# Patient Record
Sex: FEMALE | Race: OTHER | HISPANIC OR LATINO
[De-identification: names, ages, dates, MRNs, and addresses within clinical notes are randomized per-mention and may not be internally consistent; named-entity substitution may affect disease eponyms.]

---

## 2017-06-27 ENCOUNTER — APPOINTMENT (OUTPATIENT)
Dept: DERMATOLOGY | Facility: CLINIC | Age: 52
End: 2017-06-27

## 2017-06-27 ENCOUNTER — CHART COPY (OUTPATIENT)
Age: 52
End: 2017-06-27

## 2017-06-27 VITALS — DIASTOLIC BLOOD PRESSURE: 70 MMHG | WEIGHT: 145 LBS | SYSTOLIC BLOOD PRESSURE: 110 MMHG | BODY MASS INDEX: 26.52 KG/M2

## 2017-06-27 DIAGNOSIS — Z91.89 OTHER SPECIFIED PERSONAL RISK FACTORS, NOT ELSEWHERE CLASSIFIED: ICD-10-CM

## 2017-06-27 DIAGNOSIS — L81.4 OTHER MELANIN HYPERPIGMENTATION: ICD-10-CM

## 2017-06-27 DIAGNOSIS — Z12.83 ENCOUNTER FOR SCREENING FOR MALIGNANT NEOPLASM OF SKIN: ICD-10-CM

## 2017-06-27 DIAGNOSIS — D22.9 MELANOCYTIC NEVI, UNSPECIFIED: ICD-10-CM

## 2017-06-27 RX ORDER — CYCLOBENZAPRINE HYDROCHLORIDE 10 MG/1
10 TABLET, FILM COATED ORAL
Qty: 30 | Refills: 0 | Status: ACTIVE | COMMUNITY
Start: 2017-05-03

## 2017-06-27 RX ORDER — MECLIZINE HYDROCHLORIDE 25 MG/1
25 TABLET ORAL
Qty: 30 | Refills: 0 | Status: ACTIVE | COMMUNITY
Start: 2017-06-14

## 2017-06-27 RX ORDER — MONTELUKAST 10 MG/1
10 TABLET, FILM COATED ORAL
Qty: 30 | Refills: 0 | Status: ACTIVE | COMMUNITY
Start: 2017-05-03

## 2017-06-27 RX ORDER — FLUOCINONIDE 1 MG/G
0.1 CREAM TOPICAL
Qty: 60 | Refills: 0 | Status: ACTIVE | COMMUNITY
Start: 2017-05-30

## 2017-06-27 RX ORDER — ESCITALOPRAM OXALATE 10 MG/1
10 TABLET ORAL
Qty: 30 | Refills: 0 | Status: ACTIVE | COMMUNITY
Start: 2017-04-07

## 2017-07-11 PROBLEM — D22.9 INTRADERMAL NEVUS: Status: ACTIVE | Noted: 2017-06-27

## 2017-07-11 PROBLEM — L81.4 LENTIGINES: Status: ACTIVE | Noted: 2017-06-27

## 2019-05-07 ENCOUNTER — TRANSCRIPTION ENCOUNTER (OUTPATIENT)
Age: 54
End: 2019-05-07

## 2019-05-28 ENCOUNTER — APPOINTMENT (OUTPATIENT)
Dept: NEUROLOGY | Facility: CLINIC | Age: 54
End: 2019-05-28
Payer: COMMERCIAL

## 2019-05-28 VITALS
BODY MASS INDEX: 28.52 KG/M2 | HEART RATE: 90 BPM | HEIGHT: 62 IN | WEIGHT: 155 LBS | SYSTOLIC BLOOD PRESSURE: 133 MMHG | DIASTOLIC BLOOD PRESSURE: 89 MMHG

## 2019-05-28 DIAGNOSIS — G37.9 DEMYELINATING DISEASE OF CENTRAL NERVOUS SYSTEM, UNSPECIFIED: ICD-10-CM

## 2019-05-28 DIAGNOSIS — H46.9 UNSPECIFIED OPTIC NEURITIS: ICD-10-CM

## 2019-05-28 DIAGNOSIS — F41.9 ANXIETY DISORDER, UNSPECIFIED: ICD-10-CM

## 2019-05-28 DIAGNOSIS — R20.0 ANESTHESIA OF SKIN: ICD-10-CM

## 2019-05-28 DIAGNOSIS — R42 DIZZINESS AND GIDDINESS: ICD-10-CM

## 2019-05-28 PROCEDURE — 99205 OFFICE O/P NEW HI 60 MIN: CPT

## 2019-05-28 NOTE — PHYSICAL EXAM
[FreeTextEntry1] : Awake, alert, oriented x 3.  Language fluent.  Comprehension intact.  Naming intact.  Repetition intact.  Affect normal.  Cranial nerves grossly intact, except for surgical right pupil.  Motor exam: normal bulk, normal tone, 5/5 in all four extremities.  No tremors or fasciculations.  Sensory exam: impaired sensation to LT on left side of face, V1/V3, 50% reduced sensation.  DTRs: 2+ throughout, flexor plantar response bilaterally, no clonus.  Coordination: no dysmetria.  Gait: unsteady tandem gait.  Romberg - positive\par

## 2019-05-28 NOTE — HISTORY OF PRESENT ILLNESS
[FreeTextEntry1] : 52 yo woman who was diagnosed with fibromyalgia 2 years ago, followed by rheumatology.  She takes Lyrica 150mg BID and Cymbalta 60mg daily.  She also has rheumatoid arthritis for which she takes Olumiant.  She takes Flexeril 20mg as well for pain.\par \par However, over the past 4-5 months she has been having intermittent episodes of severe numbness on the left side of her face, which is intermittent, but when it occurs she can not feel the left side of her face.  When it occurs, it lasts 1-2 hours, and lately has been occurring on a daily basis, worsened by stress.  No associated headaches, although she has a history of migraines in the past.\par \par She also has had left sided tinnitus over the past 2 years, which has become more constant over the past 2 months.  She thinks she has some hearing loss on the left as well.  She also gets intermittent vertigo.  She takes Meclizine prn.\par \par She takes Losartan 100mg daily for HTN.\par \par 5-6 years ago she had a brain MRI and LP because her ophthalmologist was concerned that she might have MS based on her optic nerve exam.  She had a right eye cataract at age 28.  She says she has a history of "optic nerve swelling" in the past, which was treated with steroids.   No evidence of MS on that study.

## 2019-05-28 NOTE — ASSESSMENT
[FreeTextEntry1] : 54 yo woman with intermittent left facial numbness.  History of optic neuritis, so demyelinating disease should be ruled out (ie brainstem demyelination).  \par \par 2-year history of intermittent vertigo, tinnitus, and possible hearing impairment (Meniere's disease?).\par \par Plan:\par 1. MRI brain c/s gadolinium\par 2. ENT consult\par 3. Patient agrees with plan.\par 4. Follow up after testing completed.\par \par Asael Gaviria MD\par [facility]\par \par Time Spent: 60 minutes taking history, performing examination, and counseling on diagnosis and management.\par

## 2019-06-07 ENCOUNTER — APPOINTMENT (OUTPATIENT)
Dept: MRI IMAGING | Facility: CLINIC | Age: 54
End: 2019-06-07
Payer: COMMERCIAL

## 2019-06-07 ENCOUNTER — OUTPATIENT (OUTPATIENT)
Dept: OUTPATIENT SERVICES | Facility: HOSPITAL | Age: 54
LOS: 1 days | End: 2019-06-07
Payer: COMMERCIAL

## 2019-06-07 DIAGNOSIS — H93.A2 PULSATILE TINNITUS, LEFT EAR: ICD-10-CM

## 2019-06-07 DIAGNOSIS — Z98.89 OTHER SPECIFIED POSTPROCEDURAL STATES: Chronic | ICD-10-CM

## 2019-06-07 DIAGNOSIS — R20.0 ANESTHESIA OF SKIN: ICD-10-CM

## 2019-06-07 DIAGNOSIS — H46.9 UNSPECIFIED OPTIC NEURITIS: ICD-10-CM

## 2019-06-07 DIAGNOSIS — G37.9 DEMYELINATING DISEASE OF CENTRAL NERVOUS SYSTEM, UNSPECIFIED: ICD-10-CM

## 2019-06-07 DIAGNOSIS — H91.92 UNSPECIFIED HEARING LOSS, LEFT EAR: ICD-10-CM

## 2019-06-07 PROCEDURE — A9585: CPT

## 2019-06-07 PROCEDURE — 70553 MRI BRAIN STEM W/O & W/DYE: CPT | Mod: 26

## 2019-06-07 PROCEDURE — 70553 MRI BRAIN STEM W/O & W/DYE: CPT

## 2019-06-17 ENCOUNTER — APPOINTMENT (OUTPATIENT)
Dept: NEUROLOGY | Facility: CLINIC | Age: 54
End: 2019-06-17
Payer: COMMERCIAL

## 2019-06-17 VITALS
HEART RATE: 69 BPM | HEIGHT: 62 IN | WEIGHT: 150 LBS | DIASTOLIC BLOOD PRESSURE: 91 MMHG | BODY MASS INDEX: 27.6 KG/M2 | SYSTOLIC BLOOD PRESSURE: 142 MMHG

## 2019-06-17 DIAGNOSIS — H83.2X3 LABYRINTHINE DYSFUNCTION, BILATERAL: ICD-10-CM

## 2019-06-17 PROCEDURE — 99214 OFFICE O/P EST MOD 30 MIN: CPT

## 2019-06-18 NOTE — ASSESSMENT
[FreeTextEntry1] : 52 yo woman with intermittent left facial numbness. History of optic neuritis, so demyelinating disease should be ruled out (ie brainstem demyelination). \par \par 2-year history of intermittent vertigo, tinnitus, and possible hearing impairment (Meniere's disease?).\par \par Plan:\par 1. Consult Dr Delarosa to review brain MRI and history, provide opinion regarding white matter changes (nonspecific)\par 2. ENT consult pending\par 3. Patient agrees with plan.\par \par Asael Gaviria MD\par [facility]\par \par Time Spent: 25 minutes taking history, performing examination, and counseling on diagnosis and management.

## 2019-06-18 NOTE — PHYSICAL EXAM
[FreeTextEntry1] : Awake, alert, oriented x 3. Language fluent. Comprehension intact. Naming intact. Repetition intact. Affect normal. Cranial nerves grossly intact, except for surgical right pupil. Motor exam: normal bulk, normal tone, 5/5 in all four extremities. No tremors or fasciculations. Sensory exam: impaired sensation to LT on left side of face, V1/V3, 50% reduced sensation. DTRs: 2+ throughout, flexor plantar response bilaterally, no clonus. Coordination: no dysmetria. Gait: unsteady tandem gait. Romberg - positive\par .

## 2019-06-18 NOTE — HISTORY OF PRESENT ILLNESS
[FreeTextEntry1] : 52 yo woman with intermittent left facial numbness. History of optic neuritis, so demyelinating disease should be ruled out (ie brainstem demyelination). \par \par 2-year history of intermittent vertigo, tinnitus, and possible hearing impairment (Meniere's disease?).\par \par She has an appointment with ENT pending.\par \par MRI brain was done and shows nonspecific white matter changes in the frontal lobes bilaterally, no brainstem lesions.\par \par MRI cervical done in 2016 did not show any spinal cord lesions at this level.  MRI brain done in 2016 reports nonspecific white matter changes at that time as well.\par

## 2019-07-30 ENCOUNTER — APPOINTMENT (OUTPATIENT)
Dept: OTOLARYNGOLOGY | Facility: CLINIC | Age: 54
End: 2019-07-30
Payer: COMMERCIAL

## 2019-07-30 DIAGNOSIS — H93.A2 PULSATILE TINNITUS, LEFT EAR: ICD-10-CM

## 2019-07-30 DIAGNOSIS — H91.92 UNSPECIFIED HEARING LOSS, LEFT EAR: ICD-10-CM

## 2019-07-30 DIAGNOSIS — H90.3 SENSORINEURAL HEARING LOSS, BILATERAL: ICD-10-CM

## 2019-07-30 PROCEDURE — 99204 OFFICE O/P NEW MOD 45 MIN: CPT | Mod: 25

## 2019-07-30 PROCEDURE — 92557 COMPREHENSIVE HEARING TEST: CPT

## 2019-07-30 PROCEDURE — 92625 TINNITUS ASSESSMENT: CPT

## 2019-07-30 PROCEDURE — 92567 TYMPANOMETRY: CPT

## 2019-07-31 ENCOUNTER — APPOINTMENT (OUTPATIENT)
Dept: NEUROLOGY | Facility: CLINIC | Age: 54
End: 2019-07-31
Payer: COMMERCIAL

## 2019-07-31 VITALS
HEART RATE: 78 BPM | DIASTOLIC BLOOD PRESSURE: 83 MMHG | BODY MASS INDEX: 27.6 KG/M2 | WEIGHT: 150 LBS | SYSTOLIC BLOOD PRESSURE: 143 MMHG | HEIGHT: 62 IN

## 2019-07-31 PROBLEM — H91.92 HEARING LOSS OF LEFT EAR, UNSPECIFIED HEARING LOSS TYPE: Noted: 2019-05-28

## 2019-07-31 PROBLEM — H90.3 BILATERAL HIGH FREQUENCY SENSORINEURAL HEARING LOSS: Status: ACTIVE | Noted: 2019-07-31

## 2019-07-31 PROBLEM — H93.A2 PULSATILE TINNITUS OF LEFT EAR: Noted: 2019-05-28

## 2019-07-31 PROCEDURE — 99215 OFFICE O/P EST HI 40 MIN: CPT

## 2019-07-31 RX ORDER — CICLOPIROX 80 MG/ML
8 SOLUTION TOPICAL
Qty: 66 | Refills: 0 | Status: ACTIVE | COMMUNITY
Start: 2018-11-26

## 2019-07-31 RX ORDER — ATORVASTATIN CALCIUM 20 MG/1
20 TABLET, FILM COATED ORAL
Qty: 30 | Refills: 0 | Status: ACTIVE | COMMUNITY
Start: 2018-12-18

## 2019-07-31 RX ORDER — PILOCARPINE HYDROCHLORIDE 5 MG/1
5 TABLET, FILM COATED ORAL
Qty: 90 | Refills: 0 | Status: ACTIVE | COMMUNITY
Start: 2019-01-29

## 2019-07-31 RX ORDER — BUDESONIDE AND FORMOTEROL FUMARATE DIHYDRATE 160; 4.5 UG/1; UG/1
160-4.5 AEROSOL RESPIRATORY (INHALATION)
Qty: 102 | Refills: 0 | Status: ACTIVE | COMMUNITY
Start: 2019-02-16

## 2019-07-31 NOTE — HISTORY OF PRESENT ILLNESS
[de-identified] : 52yo woman with complicated rheumatologic and neurologic history\par here primarily for left sided tinnitus\par tinnitus is bilateral and high pitched \par mores so on the left\par moderately bothersome\par more intense when she gets L facial numbness and neck pain\par not sure if she has hearing loss\par +abnormal MRI\par LP was inconclusive\par no otologic history\par \par

## 2019-07-31 NOTE — PHYSICAL EXAM
[de-identified] : + TTP on R [Midline] : trachea located in midline position [Normal] : no rashes

## 2019-07-31 NOTE — DATA REVIEWED
[de-identified] : melinda HF SNHL, some asymmetry at 2k; tinnitus pitch match at 2k and 6k [de-identified] : +T2 hyperintensities

## 2019-07-31 NOTE — HISTORY OF PRESENT ILLNESS
[de-identified] : 54yo woman with complicated rheumatologic and neurologic history\par here primarily for left sided tinnitus\par tinnitus is bilateral and high pitched \par mores so on the left\par moderately bothersome\par more intense when she gets L facial numbness and neck pain\par not sure if she has hearing loss\par +abnormal MRI\par LP was inconclusive\par no otologic history\par \par

## 2019-07-31 NOTE — HISTORY OF PRESENT ILLNESS
[FreeTextEntry1] : Reason for consult: possible MS\par \par HPI: PARAS FRANK is a 53 year old woman \par \par 27yo - removed R eye cataract. 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 however, even after the cataract removal, R eye vision continued to worsen. Over the years, has continued to get gradually worse - R eye vision has been "closing up". Fluctuates somewhat in severity. Recently, left eye is also affected.\par 2013 - started seeing Dr. Roper, saw ? optic neuropathy or ? uveitis (pt is not sure), then sent for brain and spine MRIs, and LP. MRIs. LP was inconclusive as sample had been discarded. Was not given a diagnosis.\par 2015 - developed pain in L arm and L neck, intermittent L arm numbness and pain.\par 10/2015 - sent to rheumatologist, dx'd with fibromyalgia and RA. was started on humira, then switched to olumiant (sameer kinase inhibitor). pain is controlled with medication and with avoidance of caffeine.\par 2016 - developed vertigo, chronic and constant. associated with intermittent imbalance. tinnitus on the left. saw ENT yesterday.\par 2018 - L facial numbness, intermittent. improved with lyrica.\par 5/2019 - had 12 days of vertigo, severe.\par \par ROS/Current Sx:\par pain\par stifness\par generalized weakness as above\par otherwise negative or as per hpi\par \par PMHX:\par RA\par fibromyalgia\par progressive vision changes\par \par MEDS:\par lyrica\par cymbalta\par losartan\par olumiant\par flexeril\par CBD oil\par \par ALL: nkda\par \par SHx: no tob, no etoh, no drugs. \par \par FHx: no MS. mother with fibromyalgia.\par \par Vitals: mild htn, i advised her to discuss with pmd.\par \par Exam:\par \par AO3.  Normally conversant.  Follows commands, names, and repeats.  Good attention.\par \par PERRL, VFF, EOMI, no nystagmus, face symmetric, TUP at midline. no apd. i don't appreciate pallor on my exam, disc margins sharp.\par \par Motor: \par                                                 R:                               L:\par Del                                           5                                5\par Bi                                              5                               5\par Tri                                            5                               5\par Wrist Extensors                      5                               5\par Finger abductors                    5                               5\par                                         5                               5 \par \par HF                                           5                               5\par KE                                           5                               5\par KF                                           5                               5\par DF                                           5                               5\par PF                                           5                               5\par \par Tone                                       R                               L\par UE                                          0                                0 \par LE                                          0                                0\par \par Sensory                                RUE                      LUE                 RLE                LLE     \par LT                                           +                            +                      +                   +\par Vib                                          +                            +                      +                   +\par JPS                                         +                            +                      +                   +\par PP                                         +                            +                      +                   +\par Temp                                     +                            +                      +                   +\par \par Reflexes:\par                                              R                             L                            \par Biceps                                  3                             3\par BR                                        3                           3\par Triceps                              \par Pat                                        3                            3 \par AJ                                        3                            3\par \par TOES                                    F                            F\par \par \par Coordination:\par                                              R                             L                       \par FTN                                       0                             0 \par SWETHA                                      0                            0\par HTS                                      0                             0 \par \par Other                                                                          \par  \par Gait: normal, minimal diff with tandem but can do it.\par \par                     Assistance: none\par \par CSF 6/2013\par nmo csf neg\par protein <10\par gluc 49\par \par MRI L spine 2015 - read as degen disc dz\par \par MRI C spine 12/2016 - read as disc herniations but no cord signal changes\par \par MRI brain 12/2016 - read as nonspecific t2h.\par \par MRI brain 6/2019 - reviewed and notable for several nonspecifc t2h.\par \par 12/2016 EMG - read as no e/o peripheral neuropathy or LS radiculopathy.\par \par \par AP: 54yo w/ several sensory complaints, over several years, at one point thought to have ? optic neuropathy vs uveitis and had brain MRI read as having nonspecific t2h. Recent brain MRI also demonstrates nonspecific t2h and no lesions suggestive of demyelination or MS. Clinically, the gradual nature of her vision loss over years is not suggestive of optic neuritis, which is characterized by more of an acute change. I cannot rule out a possible optic neuropathy of another etiology though I do not see an APD on exam and I did not note pallor on my exam, which, given the extent of her visual complaints, suggests an alternate diagnosis.\par \par - pt advised to send me copy of her MRI C spine for my review and confirmation that there are no cord lesions.\par - pt will follow up with Dr. Gaviria, rheum, and neuro-ophtho for further management. RTC PRN.\par \par \par \par \par

## 2019-07-31 NOTE — PHYSICAL EXAM
[de-identified] : + TTP on R [Midline] : trachea located in midline position [Normal] : no rashes

## 2019-07-31 NOTE — DATA REVIEWED
[de-identified] : melinda HF SNHL, some asymmetry at 2k; tinnitus pitch match at 2k and 6k [de-identified] : +T2 hyperintensities

## 2019-12-10 ENCOUNTER — APPOINTMENT (OUTPATIENT)
Dept: OTOLARYNGOLOGY | Facility: CLINIC | Age: 54
End: 2019-12-10
Payer: COMMERCIAL

## 2019-12-10 VITALS
SYSTOLIC BLOOD PRESSURE: 154 MMHG | WEIGHT: 145 LBS | HEART RATE: 89 BPM | DIASTOLIC BLOOD PRESSURE: 88 MMHG | HEIGHT: 62 IN | BODY MASS INDEX: 26.68 KG/M2

## 2019-12-10 DIAGNOSIS — H93.12 TINNITUS, LEFT EAR: ICD-10-CM

## 2019-12-10 DIAGNOSIS — M26.621 ARTHRALGIA OF RIGHT TEMPOROMANDIBULAR JOINT: ICD-10-CM

## 2019-12-10 PROCEDURE — 99213 OFFICE O/P EST LOW 20 MIN: CPT

## 2019-12-10 RX ORDER — SULFASALAZINE 500 MG/1
500 TABLET ORAL
Qty: 60 | Refills: 0 | Status: DISCONTINUED | COMMUNITY
Start: 2017-05-16 | End: 2019-12-10

## 2019-12-10 RX ORDER — KETOCONAZOLE 20 MG/G
2 CREAM TOPICAL
Qty: 30 | Refills: 0 | Status: DISCONTINUED | COMMUNITY
Start: 2018-11-26 | End: 2019-12-10

## 2019-12-10 RX ORDER — OXAPROZIN 600 MG/1
600 TABLET ORAL
Qty: 60 | Refills: 0 | Status: DISCONTINUED | COMMUNITY
Start: 2017-05-03 | End: 2019-12-10

## 2019-12-10 RX ORDER — BARICITINIB 1 MG/1
1 TABLET, FILM COATED ORAL
Refills: 0 | Status: ACTIVE | COMMUNITY

## 2019-12-10 RX ORDER — ALBUTEROL SULFATE 90 UG/1
108 (90 BASE) AEROSOL, METERED RESPIRATORY (INHALATION)
Qty: 8 | Refills: 0 | Status: DISCONTINUED | COMMUNITY
Start: 2017-04-07 | End: 2019-12-10

## 2019-12-10 RX ORDER — CELECOXIB 200 MG/1
200 CAPSULE ORAL
Qty: 30 | Refills: 0 | Status: DISCONTINUED | COMMUNITY
Start: 2017-05-03 | End: 2019-12-10

## 2019-12-10 RX ORDER — LEFLUNOMIDE 10 MG/1
10 TABLET, FILM COATED ORAL
Qty: 30 | Refills: 0 | Status: DISCONTINUED | COMMUNITY
Start: 2017-06-14 | End: 2019-12-10

## 2019-12-10 RX ORDER — PREDNISONE 10 MG/1
10 TABLET ORAL
Qty: 30 | Refills: 0 | Status: DISCONTINUED | COMMUNITY
Start: 2017-05-16 | End: 2019-12-10

## 2019-12-10 RX ORDER — OXYBUTYNIN CHLORIDE 5 MG/1
5 TABLET ORAL
Qty: 30 | Refills: 0 | Status: DISCONTINUED | COMMUNITY
Start: 2019-01-23 | End: 2019-12-10

## 2019-12-10 RX ORDER — GABAPENTIN 400 MG/1
400 CAPSULE ORAL
Qty: 90 | Refills: 0 | Status: DISCONTINUED | COMMUNITY
Start: 2017-04-07 | End: 2019-12-10

## 2019-12-10 RX ORDER — METHOCARBAMOL 750 MG/1
750 TABLET, FILM COATED ORAL
Qty: 30 | Refills: 0 | Status: DISCONTINUED | COMMUNITY
Start: 2018-09-25 | End: 2019-12-10

## 2019-12-10 RX ORDER — ALPRAZOLAM 0.5 MG/1
0.5 TABLET ORAL
Qty: 1 | Refills: 0 | Status: DISCONTINUED | COMMUNITY
Start: 2019-05-28 | End: 2019-12-10

## 2019-12-10 RX ORDER — LOSARTAN POTASSIUM 100 MG/1
100 TABLET, FILM COATED ORAL
Qty: 30 | Refills: 0 | Status: DISCONTINUED | COMMUNITY
Start: 2019-04-11 | End: 2019-12-10

## 2019-12-10 RX ORDER — ERGOCALCIFEROL 1.25 MG/1
1.25 MG CAPSULE, LIQUID FILLED ORAL
Qty: 4 | Refills: 0 | Status: DISCONTINUED | COMMUNITY
Start: 2018-12-18 | End: 2019-12-10

## 2019-12-10 RX ORDER — LEVOFLOXACIN 500 MG/1
500 TABLET, FILM COATED ORAL
Qty: 5 | Refills: 0 | Status: DISCONTINUED | COMMUNITY
Start: 2017-05-22 | End: 2019-12-10

## 2019-12-13 PROBLEM — H93.12 LEFT-SIDED TINNITUS: Status: ACTIVE | Noted: 2019-07-31

## 2019-12-13 PROBLEM — M26.621 ARTHRALGIA OF RIGHT TEMPOROMANDIBULAR JOINT: Status: ACTIVE | Noted: 2019-12-13

## 2019-12-13 NOTE — HISTORY OF PRESENT ILLNESS
[de-identified] : 54 year old woman follow up left tinnitus.  States bilateral tinnitus, left is worse than the right.  STates the tinnitus is worse in both ears since last visit July 30, 2019.  States sounds like an elongated beep sound, lasting a minute, occurring in one ear at a time, but occurs in both, more frequently in the right.  Denies otalgia.  States feels hearing is worse than at last visit. has tried tinnitus ene but did not load it properly for notched therapy. also has not had myofascial release therapy.

## 2019-12-13 NOTE — PHYSICAL EXAM
[de-identified] : + TTP on R [Midline] : trachea located in midline position [Normal] : no rashes

## 2021-01-05 ENCOUNTER — APPOINTMENT (OUTPATIENT)
Dept: GASTROENTEROLOGY | Facility: CLINIC | Age: 56
End: 2021-01-05

## 2021-09-14 ENCOUNTER — NON-APPOINTMENT (OUTPATIENT)
Age: 56
End: 2021-09-14

## 2021-09-20 ENCOUNTER — APPOINTMENT (OUTPATIENT)
Dept: SURGERY | Facility: CLINIC | Age: 56
End: 2021-09-20
Payer: COMMERCIAL

## 2021-09-20 VITALS
BODY MASS INDEX: 25.76 KG/M2 | DIASTOLIC BLOOD PRESSURE: 100 MMHG | TEMPERATURE: 98 F | SYSTOLIC BLOOD PRESSURE: 165 MMHG | HEIGHT: 62 IN | HEART RATE: 71 BPM | WEIGHT: 140 LBS

## 2021-09-20 DIAGNOSIS — K80.20 CALCULUS OF GALLBLADDER W/OUT CHOLECYSTITIS W/OUT OBSTRUCTION: ICD-10-CM

## 2021-09-20 PROCEDURE — 99203 OFFICE O/P NEW LOW 30 MIN: CPT

## 2021-09-20 NOTE — PHYSICAL EXAM
[Respiratory Effort] : normal respiratory effort [Alert] : alert [Oriented to Person] : oriented to person [Oriented to Place] : oriented to place [Oriented to Time] : oriented to time [Calm] : calm [JVD] : no jugular venous distention  [Abdomen Tenderness] : ~T ~M No abdominal tenderness [de-identified] : GI GUAJARDO NAD [de-identified] : EOMI [de-identified] : soft NT ND

## 2021-09-20 NOTE — HISTORY OF PRESENT ILLNESS
[de-identified] : 57 yo female presents for evaluation of recurrent episodes of abdominal bloating and nausea s/p fatty meals. She states she had a recent endoscopy which was normal and was then sent for a RUQ sonogram which revealed gallstones. She denies any Severe pains or vomiting. She denies any abdominal surgeries.

## 2021-10-07 ENCOUNTER — OUTPATIENT (OUTPATIENT)
Dept: OUTPATIENT SERVICES | Facility: HOSPITAL | Age: 56
LOS: 1 days | Discharge: HOME | End: 2021-10-07
Payer: COMMERCIAL

## 2021-10-07 VITALS
HEART RATE: 66 BPM | DIASTOLIC BLOOD PRESSURE: 97 MMHG | TEMPERATURE: 98 F | RESPIRATION RATE: 16 BRPM | WEIGHT: 149.03 LBS | SYSTOLIC BLOOD PRESSURE: 153 MMHG | OXYGEN SATURATION: 98 % | HEIGHT: 61 IN

## 2021-10-07 DIAGNOSIS — K80.20 CALCULUS OF GALLBLADDER WITHOUT CHOLECYSTITIS WITHOUT OBSTRUCTION: ICD-10-CM

## 2021-10-07 DIAGNOSIS — Z98.89 OTHER SPECIFIED POSTPROCEDURAL STATES: Chronic | ICD-10-CM

## 2021-10-07 DIAGNOSIS — Z01.818 ENCOUNTER FOR OTHER PREPROCEDURAL EXAMINATION: ICD-10-CM

## 2021-10-07 LAB
ALBUMIN SERPL ELPH-MCNC: 4.3 G/DL — SIGNIFICANT CHANGE UP (ref 3.3–5)
ALP SERPL-CCNC: 78 U/L — SIGNIFICANT CHANGE UP (ref 40–120)
ALT FLD-CCNC: 31 U/L — SIGNIFICANT CHANGE UP (ref 12–78)
ANION GAP SERPL CALC-SCNC: 5 MMOL/L — SIGNIFICANT CHANGE UP (ref 5–17)
AST SERPL-CCNC: 21 U/L — SIGNIFICANT CHANGE UP (ref 15–37)
BILIRUB SERPL-MCNC: 0.5 MG/DL — SIGNIFICANT CHANGE UP (ref 0.2–1.2)
BLD GP AB SCN SERPL QL: SIGNIFICANT CHANGE UP
BUN SERPL-MCNC: 16 MG/DL — SIGNIFICANT CHANGE UP (ref 7–23)
CALCIUM SERPL-MCNC: 9.9 MG/DL — SIGNIFICANT CHANGE UP (ref 8.5–10.1)
CHLORIDE SERPL-SCNC: 105 MMOL/L — SIGNIFICANT CHANGE UP (ref 96–108)
CO2 SERPL-SCNC: 27 MMOL/L — SIGNIFICANT CHANGE UP (ref 22–31)
CREAT SERPL-MCNC: 0.84 MG/DL — SIGNIFICANT CHANGE UP (ref 0.5–1.3)
GLUCOSE SERPL-MCNC: 102 MG/DL — HIGH (ref 70–99)
HCG UR QL: NEGATIVE — SIGNIFICANT CHANGE UP
HCT VFR BLD CALC: 41.9 % — SIGNIFICANT CHANGE UP (ref 34.5–45)
HGB BLD-MCNC: 13.9 G/DL — SIGNIFICANT CHANGE UP (ref 11.5–15.5)
MCHC RBC-ENTMCNC: 29 PG — SIGNIFICANT CHANGE UP (ref 27–34)
MCHC RBC-ENTMCNC: 33.2 GM/DL — SIGNIFICANT CHANGE UP (ref 32–36)
MCV RBC AUTO: 87.5 FL — SIGNIFICANT CHANGE UP (ref 80–100)
NRBC # BLD: 0 /100 WBCS — SIGNIFICANT CHANGE UP (ref 0–0)
PLATELET # BLD AUTO: 209 K/UL — SIGNIFICANT CHANGE UP (ref 150–400)
POTASSIUM SERPL-MCNC: 4.1 MMOL/L — SIGNIFICANT CHANGE UP (ref 3.5–5.3)
POTASSIUM SERPL-SCNC: 4.1 MMOL/L — SIGNIFICANT CHANGE UP (ref 3.5–5.3)
PROT SERPL-MCNC: 7.4 GM/DL — SIGNIFICANT CHANGE UP (ref 6–8.3)
RBC # BLD: 4.79 M/UL — SIGNIFICANT CHANGE UP (ref 3.8–5.2)
RBC # FLD: 12.9 % — SIGNIFICANT CHANGE UP (ref 10.3–14.5)
SODIUM SERPL-SCNC: 137 MMOL/L — SIGNIFICANT CHANGE UP (ref 135–145)
WBC # BLD: 3.87 K/UL — SIGNIFICANT CHANGE UP (ref 3.8–10.5)
WBC # FLD AUTO: 3.87 K/UL — SIGNIFICANT CHANGE UP (ref 3.8–10.5)

## 2021-10-07 PROCEDURE — 93010 ELECTROCARDIOGRAM REPORT: CPT

## 2021-10-07 NOTE — H&P PST ADULT - TOBACCO USE
Patient alert and oriented x4. On Bipap this AM, using NC on 5L this afternoon, sats in the mid 90s, patient denies SOB. Pain 8/10, prn PO pain medication given, patient reported relief of pain. Denies nausea. Tolerating diet, good appetite. Continuing IV antibiotics and IV solu-medrol. Large incontinent of bladder x1 this shift. Discharge pending.    Never smoker

## 2021-10-07 NOTE — H&P PST ADULT - NSICDXFAMILYHX_GEN_ALL_CORE_FT
FAMILY HISTORY:  Father  Still living? Unknown  Family history of diabetes mellitus (DM), Age at diagnosis: Age Unknown    Mother  Still living? Unknown  Family history of hypertension, Age at diagnosis: Age Unknown    Grandparent  Still living? Unknown  Family history of diabetes mellitus (DM), Age at diagnosis: Age Unknown

## 2021-10-07 NOTE — H&P PST ADULT - ASSESSMENT
56 year old female with a past medical history of RA and fibromyalgia c/o right upper abdominal pain. She went to her PCP, imaging was performed and reveled gallstones.  She is scheduled for a laparoscopic cholecystectomy possible open on 10/14/2021.    CAPRINI SCORE [CLOT]    AGE RELATED RISK FACTORS                                                       MOBILITY RELATED FACTORS  [x ] Age 41-60 years                                            (1 Point)                  [ ] Bed rest                                                        (1 Point)  [ ] Age: 61-74 years                                           (2 Points)                 [ ] Plaster cast                                                   (2 Points)  [ ] Age= 75 years                                              (3 Points)                 [ ] Bed bound for more than 72 hours                 (2 Points)    DISEASE RELATED RISK FACTORS                                               GENDER SPECIFIC FACTORS  [ ] Edema in the lower extremities                       (1 Point)                  [ ] Pregnancy                                                     (1 Point)  [ ] Varicose veins                                               (1 Point)                  [ ] Post-partum < 6 weeks                                   (1 Point)             x] BMI > 25 Kg/m2                                            (1 Point)                  [ ] Hormonal therapy  or oral contraception          (1 Point)                 [ ] Sepsis (in the previous month)                        (1 Point)                  [ ] History of pregnancy complications                 (1 point)  [ ] Pneumonia or serious lung disease                                               [ ] Unexplained or recurrent                     (1 Point)           (in the previous month)                               (1 Point)  [ ] Abnormal pulmonary function test                     (1 Point)                 SURGERY RELATED RISK FACTORS  [ ] Acute myocardial infarction                              (1 Point)                 [ ]  Section                                             (1 Point)  [ ] Congestive heart failure (in the previous month)  (1 Point)               [ ] Minor surgery                                                  (1 Point)   [ ] Inflammatory bowel disease                             (1 Point)                 [ ] Arthroscopic surgery                                        (2 Points)  [ ] Central venous access                                      (2 Points)                [x ] General surgery lasting more than 45 minutes   (2 Points)       [ ] Stroke (in the previous month)                          (5 Points)               [ ] Elective arthroplasty                                         (5 Points)                                                                                                                                               HEMATOLOGY RELATED FACTORS                                                 TRAUMA RELATED RISK FACTORS  [ ] Prior episodes of VTE                                     (3 Points)                [ ] Fracture of the hip, pelvis, or leg                       (5 Points)  [ ] Positive family history for VTE                         (3 Points)                 [ ] Acute spinal cord injury (in the previous month)  (5 Points)  [ ] Prothrombin 54330 A                                     (3 Points)                 [ ] Paralysis  (less than 1 month)                             (5 Points)  [ ] Factor V Leiden                                             (3 Points)                  [ ] Multiple Trauma within 1 month                        (5 Points)  [ ] Lupus anticoagulants                                     (3 Points)                                                           [ ] Anticardiolipin antibodies                               (3 Points)                                                       [ ] High homocysteine in the blood                      (3 Points)                                             [ ] Other congenital or acquired thrombophilia      (3 Points)                                                [ ] Heparin induced thrombocytopenia                  (3 Points)                                          Total Score [       4  ]    Caprini Score 0 - 2:  Low Risk, No VTE Prophylaxis required for most patients, encourage ambulation  Caprini Score 3 - 6:  At Risk, pharmacologic VTE prophylaxis is indicated for most patients (in the absence of a contraindication)  Caprini Score Greater than or = 7:  High Risk, pharmacologic VTE prophylaxis is indicated for most patients (in the absence of a contraindication)

## 2021-10-07 NOTE — H&P PST ADULT - HISTORY OF PRESENT ILLNESS
56 year old female with a past medical history of RA and fibromyalgia c/o right upper abdominal pain. She went to her PCP, imaging was performed and reveled gallstones.  She is scheduled for a laparoscopic cholecystectomy possible open on 10/14/2021.    She denies fever, cough, SOB, recent travels, and sick contacts.

## 2021-10-07 NOTE — H&P PST ADULT - HEALTH CARE MAINTENANCE
Pfizer vaccine completed, pt aware covid swab is required 72 hours prior to surgery.  Colonoscopy and endoscopy 2021- ok

## 2021-10-13 ENCOUNTER — TRANSCRIPTION ENCOUNTER (OUTPATIENT)
Age: 56
End: 2021-10-13

## 2021-10-14 ENCOUNTER — RESULT REVIEW (OUTPATIENT)
Age: 56
End: 2021-10-14

## 2021-10-14 ENCOUNTER — OUTPATIENT (OUTPATIENT)
Dept: OUTPATIENT SERVICES | Facility: HOSPITAL | Age: 56
LOS: 1 days | Discharge: HOME | End: 2021-10-14
Payer: COMMERCIAL

## 2021-10-14 ENCOUNTER — APPOINTMENT (OUTPATIENT)
Dept: SURGERY | Facility: HOSPITAL | Age: 56
End: 2021-10-14

## 2021-10-14 VITALS
TEMPERATURE: 98 F | OXYGEN SATURATION: 99 % | WEIGHT: 145.95 LBS | HEART RATE: 72 BPM | SYSTOLIC BLOOD PRESSURE: 142 MMHG | HEIGHT: 62 IN | RESPIRATION RATE: 17 BRPM | DIASTOLIC BLOOD PRESSURE: 84 MMHG

## 2021-10-14 VITALS
TEMPERATURE: 98 F | HEART RATE: 74 BPM | OXYGEN SATURATION: 99 % | SYSTOLIC BLOOD PRESSURE: 122 MMHG | DIASTOLIC BLOOD PRESSURE: 68 MMHG | RESPIRATION RATE: 16 BRPM

## 2021-10-14 DIAGNOSIS — Z98.89 OTHER SPECIFIED POSTPROCEDURAL STATES: Chronic | ICD-10-CM

## 2021-10-14 LAB — HCG UR QL: NEGATIVE — SIGNIFICANT CHANGE UP

## 2021-10-14 PROCEDURE — 88304 TISSUE EXAM BY PATHOLOGIST: CPT | Mod: 26

## 2021-10-14 PROCEDURE — 47562 LAPAROSCOPIC CHOLECYSTECTOMY: CPT

## 2021-10-14 RX ORDER — SODIUM CHLORIDE 9 MG/ML
3 INJECTION INTRAMUSCULAR; INTRAVENOUS; SUBCUTANEOUS EVERY 8 HOURS
Refills: 0 | Status: DISCONTINUED | OUTPATIENT
Start: 2021-10-14 | End: 2021-10-14

## 2021-10-14 RX ORDER — HYDROMORPHONE HYDROCHLORIDE 2 MG/ML
0.5 INJECTION INTRAMUSCULAR; INTRAVENOUS; SUBCUTANEOUS
Refills: 0 | Status: DISCONTINUED | OUTPATIENT
Start: 2021-10-14 | End: 2021-10-14

## 2021-10-14 RX ORDER — SODIUM CHLORIDE 9 MG/ML
1000 INJECTION, SOLUTION INTRAVENOUS
Refills: 0 | Status: DISCONTINUED | OUTPATIENT
Start: 2021-10-14 | End: 2021-10-14

## 2021-10-14 RX ADMIN — SODIUM CHLORIDE 75 MILLILITER(S): 9 INJECTION, SOLUTION INTRAVENOUS at 09:29

## 2021-10-14 RX ADMIN — HYDROMORPHONE HYDROCHLORIDE 0.5 MILLIGRAM(S): 2 INJECTION INTRAMUSCULAR; INTRAVENOUS; SUBCUTANEOUS at 09:25

## 2021-10-14 NOTE — ASU PATIENT PROFILE, ADULT - NSICDXPASTMEDICALHX_GEN_ALL_CORE_FT
PAST MEDICAL HISTORY:  Asthma     H/O fibromyalgia     Rheumatoid arthritis     Seasonal allergies

## 2021-10-15 LAB — SURGICAL PATHOLOGY STUDY: SIGNIFICANT CHANGE UP

## 2021-10-18 PROBLEM — Z87.39 PERSONAL HISTORY OF OTHER DISEASES OF THE MUSCULOSKELETAL SYSTEM AND CONNECTIVE TISSUE: Chronic | Status: ACTIVE | Noted: 2021-10-07

## 2021-10-18 PROBLEM — J45.909 UNSPECIFIED ASTHMA, UNCOMPLICATED: Chronic | Status: ACTIVE | Noted: 2021-10-14

## 2021-10-18 PROBLEM — M06.9 RHEUMATOID ARTHRITIS, UNSPECIFIED: Chronic | Status: ACTIVE | Noted: 2021-10-07

## 2021-10-20 DIAGNOSIS — Z83.3 FAMILY HISTORY OF DIABETES MELLITUS: ICD-10-CM

## 2021-10-20 DIAGNOSIS — R10.11 RIGHT UPPER QUADRANT PAIN: ICD-10-CM

## 2021-10-20 DIAGNOSIS — Z82.49 FAMILY HISTORY OF ISCHEMIC HEART DISEASE AND OTHER DISEASES OF THE CIRCULATORY SYSTEM: ICD-10-CM

## 2021-10-20 DIAGNOSIS — M06.9 RHEUMATOID ARTHRITIS, UNSPECIFIED: ICD-10-CM

## 2021-10-20 DIAGNOSIS — K80.10 CALCULUS OF GALLBLADDER WITH CHRONIC CHOLECYSTITIS WITHOUT OBSTRUCTION: ICD-10-CM

## 2021-10-20 DIAGNOSIS — J45.909 UNSPECIFIED ASTHMA, UNCOMPLICATED: ICD-10-CM

## 2021-10-20 DIAGNOSIS — K80.20 CALCULUS OF GALLBLADDER WITHOUT CHOLECYSTITIS WITHOUT OBSTRUCTION: ICD-10-CM

## 2021-10-20 DIAGNOSIS — M79.7 FIBROMYALGIA: ICD-10-CM

## 2021-10-25 ENCOUNTER — APPOINTMENT (OUTPATIENT)
Dept: SURGERY | Facility: CLINIC | Age: 56
End: 2021-10-25
Payer: COMMERCIAL

## 2021-10-25 VITALS
HEIGHT: 62 IN | DIASTOLIC BLOOD PRESSURE: 92 MMHG | BODY MASS INDEX: 26.13 KG/M2 | SYSTOLIC BLOOD PRESSURE: 154 MMHG | TEMPERATURE: 98.1 F | WEIGHT: 142 LBS | HEART RATE: 79 BPM

## 2021-10-25 DIAGNOSIS — Z09 ENCOUNTER FOR FOLLOW-UP EXAMINATION AFTER COMPLETED TREATMENT FOR CONDITIONS OTHER THAN MALIGNANT NEOPLASM: ICD-10-CM

## 2021-10-25 PROCEDURE — 99024 POSTOP FOLLOW-UP VISIT: CPT

## 2021-10-25 NOTE — ASSESSMENT
[FreeTextEntry1] : Patient is doing well, tolerating a diet and moving her bowels normally.\par \par \par \par \par incisions are c/d/i and healing well.\par \par \par f/u prn

## 2022-12-27 ENCOUNTER — EMERGENCY (EMERGENCY)
Facility: HOSPITAL | Age: 57
LOS: 1 days | Discharge: LEFT BEFORE DISCHARGE | End: 2022-12-27
Attending: EMERGENCY MEDICINE
Payer: COMMERCIAL

## 2022-12-27 ENCOUNTER — EMERGENCY (EMERGENCY)
Facility: HOSPITAL | Age: 57
LOS: 1 days | Discharge: HOME | End: 2022-12-27
Attending: EMERGENCY MEDICINE
Payer: COMMERCIAL

## 2022-12-27 VITALS
RESPIRATION RATE: 18 BRPM | TEMPERATURE: 98 F | SYSTOLIC BLOOD PRESSURE: 178 MMHG | WEIGHT: 132.06 LBS | HEIGHT: 62 IN | DIASTOLIC BLOOD PRESSURE: 94 MMHG | OXYGEN SATURATION: 98 % | HEART RATE: 76 BPM

## 2022-12-27 VITALS
WEIGHT: 136.69 LBS | TEMPERATURE: 97 F | HEART RATE: 75 BPM | OXYGEN SATURATION: 98 % | DIASTOLIC BLOOD PRESSURE: 111 MMHG | SYSTOLIC BLOOD PRESSURE: 172 MMHG | RESPIRATION RATE: 18 BRPM

## 2022-12-27 DIAGNOSIS — Z98.89 OTHER SPECIFIED POSTPROCEDURAL STATES: Chronic | ICD-10-CM

## 2022-12-27 LAB
ALBUMIN SERPL ELPH-MCNC: 5 G/DL — SIGNIFICANT CHANGE UP (ref 3.3–5)
ALP SERPL-CCNC: 67 U/L — SIGNIFICANT CHANGE UP (ref 40–120)
ALT FLD-CCNC: 26 U/L — SIGNIFICANT CHANGE UP (ref 10–45)
ANION GAP SERPL CALC-SCNC: 11 MMOL/L — SIGNIFICANT CHANGE UP (ref 5–17)
APTT BLD: 26 SEC — LOW (ref 27.5–35.5)
AST SERPL-CCNC: 25 U/L — SIGNIFICANT CHANGE UP (ref 10–40)
BASOPHILS # BLD AUTO: 0.05 K/UL — SIGNIFICANT CHANGE UP (ref 0–0.2)
BASOPHILS NFR BLD AUTO: 0.9 % — SIGNIFICANT CHANGE UP (ref 0–2)
BILIRUB SERPL-MCNC: 0.3 MG/DL — SIGNIFICANT CHANGE UP (ref 0.2–1.2)
BUN SERPL-MCNC: 16 MG/DL — SIGNIFICANT CHANGE UP (ref 7–23)
CALCIUM SERPL-MCNC: 9.6 MG/DL — SIGNIFICANT CHANGE UP (ref 8.4–10.5)
CHLORIDE SERPL-SCNC: 102 MMOL/L — SIGNIFICANT CHANGE UP (ref 96–108)
CO2 SERPL-SCNC: 27 MMOL/L — SIGNIFICANT CHANGE UP (ref 22–31)
CREAT SERPL-MCNC: 0.79 MG/DL — SIGNIFICANT CHANGE UP (ref 0.5–1.3)
EGFR: 88 ML/MIN/1.73M2 — SIGNIFICANT CHANGE UP
EOSINOPHIL # BLD AUTO: 0.2 K/UL — SIGNIFICANT CHANGE UP (ref 0–0.5)
EOSINOPHIL NFR BLD AUTO: 3.5 % — SIGNIFICANT CHANGE UP (ref 0–6)
GLUCOSE SERPL-MCNC: 101 MG/DL — HIGH (ref 70–99)
HCT VFR BLD CALC: 41.7 % — SIGNIFICANT CHANGE UP (ref 34.5–45)
HGB BLD-MCNC: 13.4 G/DL — SIGNIFICANT CHANGE UP (ref 11.5–15.5)
IMM GRANULOCYTES NFR BLD AUTO: 0.4 % — SIGNIFICANT CHANGE UP (ref 0–0.9)
INR BLD: 1.04 RATIO — SIGNIFICANT CHANGE UP (ref 0.88–1.16)
LIDOCAIN IGE QN: 39 U/L — SIGNIFICANT CHANGE UP (ref 7–60)
LYMPHOCYTES # BLD AUTO: 1.6 K/UL — SIGNIFICANT CHANGE UP (ref 1–3.3)
LYMPHOCYTES # BLD AUTO: 28.3 % — SIGNIFICANT CHANGE UP (ref 13–44)
MCHC RBC-ENTMCNC: 29.4 PG — SIGNIFICANT CHANGE UP (ref 27–34)
MCHC RBC-ENTMCNC: 32.1 GM/DL — SIGNIFICANT CHANGE UP (ref 32–36)
MCV RBC AUTO: 91.4 FL — SIGNIFICANT CHANGE UP (ref 80–100)
MONOCYTES # BLD AUTO: 0.61 K/UL — SIGNIFICANT CHANGE UP (ref 0–0.9)
MONOCYTES NFR BLD AUTO: 10.8 % — SIGNIFICANT CHANGE UP (ref 2–14)
NEUTROPHILS # BLD AUTO: 3.18 K/UL — SIGNIFICANT CHANGE UP (ref 1.8–7.4)
NEUTROPHILS NFR BLD AUTO: 56.1 % — SIGNIFICANT CHANGE UP (ref 43–77)
NRBC # BLD: 0 /100 WBCS — SIGNIFICANT CHANGE UP (ref 0–0)
PLATELET # BLD AUTO: 286 K/UL — SIGNIFICANT CHANGE UP (ref 150–400)
POTASSIUM SERPL-MCNC: 3.9 MMOL/L — SIGNIFICANT CHANGE UP (ref 3.5–5.3)
POTASSIUM SERPL-SCNC: 3.9 MMOL/L — SIGNIFICANT CHANGE UP (ref 3.5–5.3)
PROT SERPL-MCNC: 7.4 G/DL — SIGNIFICANT CHANGE UP (ref 6–8.3)
PROTHROM AB SERPL-ACNC: 12.1 SEC — SIGNIFICANT CHANGE UP (ref 10.5–13.4)
RBC # BLD: 4.56 M/UL — SIGNIFICANT CHANGE UP (ref 3.8–5.2)
RBC # FLD: 13.6 % — SIGNIFICANT CHANGE UP (ref 10.3–14.5)
SODIUM SERPL-SCNC: 140 MMOL/L — SIGNIFICANT CHANGE UP (ref 135–145)
WBC # BLD: 5.66 K/UL — SIGNIFICANT CHANGE UP (ref 3.8–10.5)
WBC # FLD AUTO: 5.66 K/UL — SIGNIFICANT CHANGE UP (ref 3.8–10.5)

## 2022-12-27 PROCEDURE — 99284 EMERGENCY DEPT VISIT MOD MDM: CPT

## 2022-12-27 PROCEDURE — 99282 EMERGENCY DEPT VISIT SF MDM: CPT

## 2022-12-27 RX ORDER — ONDANSETRON 8 MG/1
4 TABLET, FILM COATED ORAL ONCE
Refills: 0 | Status: DISCONTINUED | OUTPATIENT
Start: 2022-12-27 | End: 2022-12-31

## 2022-12-27 RX ORDER — SODIUM CHLORIDE 9 MG/ML
1000 INJECTION INTRAMUSCULAR; INTRAVENOUS; SUBCUTANEOUS ONCE
Refills: 0 | Status: DISCONTINUED | OUTPATIENT
Start: 2022-12-27 | End: 2022-12-31

## 2022-12-27 RX ORDER — KETOROLAC TROMETHAMINE 30 MG/ML
30 SYRINGE (ML) INJECTION ONCE
Refills: 0 | Status: COMPLETED | OUTPATIENT
Start: 2022-12-27 | End: 2022-12-27

## 2022-12-27 RX ADMIN — Medication 30 MILLILITER(S): at 22:07

## 2022-12-27 NOTE — ED PROVIDER NOTE - PATIENT PORTAL LINK FT
You can access the FollowMyHealth Patient Portal offered by [facility] by registering at the following website: [email or web address]. By joining [facility]’s FollowMyHealth portal, you will also be able to view your health information using other applications (apps) compatible with our system.

## 2022-12-27 NOTE — ED PROVIDER NOTE - OBJECTIVE STATEMENT
57 year old female with PMHX of fibromyalgia presents to the ED with complaints of abdominal pain for the past three days. Patient states that she initially developed the pain on 12/24 after she ate a lot, and believed at the time that her symptoms could be attributed to bad food exposure. Patient reports, however, that he pain has been persistent over the last three days and worsened today. Patient thus visited an Urgent Care earlier today to seek evaluation, and was subsequently referred to the ED for further management. Patient notes some associated nausea, however denies any vomiting, diarrhea, fevers, urinary symptoms, chest pain, or shortness of breath. NKDA.

## 2022-12-27 NOTE — ED PROVIDER NOTE - NSFOLLOWUPINSTRUCTIONS_ED_ALL_ED_FT
you were seen in the ER today for abdominal pain     it is important to follow up with gastroenterology regarding today's visit    bring a copy of your results to your follow up appointment     medication from pharmacy and take as instructed    you can take Maalox as needed for your abdominal pain     if your symptoms worsen, persist, or if any new symptoms develop, or if you experience any signs of distress, return to the ED right away.

## 2022-12-27 NOTE — ED PROVIDER NOTE - CLINICAL SUMMARY MEDICAL DECISION MAKING FREE TEXT BOX
Patient presents with several days of epigastric intermittent pain worse with eating.  On exam, patient is well-appearing, unremarkable vital signs, soft minimally tender abdomen in the epigastric region without guarding or rebound.  Patient had work-up including labs, CT of abdomen and pelvis.  Labs are unremarkable, CT shows signs of gastritis versus PUD, without any other acute process.  Patient is status postcholecystectomy so suspicion for gallbladder pathology is very low.  Patient got relief with Maalox.  Symptoms are most consistent with PUD versus gastritis.  There are no signs of any other emergent process at this time, and patient is safe for discharge home with supportive care with Pepcid, Maalox as needed, GI follow-up for further evaluation of  suspected PUD as an outpatient.  Patient was given return precautions.

## 2022-12-27 NOTE — ED PROVIDER NOTE - OBJECTIVE STATEMENT
56 y/o female, hx of fibromyalgia, cholecystectomy, presents to the ED with complaint of abdominal pain x three days. states started paris ulisses after she ate a heavy meal. states pain started in her lower abdomen and worked its way up to her epigastric area. states pain is intermittent. made worse with food. denies f/n/v/d, CP, SOB, LOC, urinary symptoms, dizziness, HA.

## 2022-12-27 NOTE — ED PROVIDER NOTE - BIRTH SEX
Spoke to patient and relayed from her provider, \"that her X-rays showed some arthritis changes but no fracture. Depending on what is bothering her the most, she would recommend that she see Orthopedics or Podiatry for consult. \" Patient did cancel her upcoming appointment on the 14th. Female

## 2022-12-27 NOTE — ED PROVIDER NOTE - NSICDXPASTMEDICALHX_GEN_ALL_CORE_FT
PAST MEDICAL HISTORY:  Asthma     H/O fibromyalgia     Rheumatoid arthritis     Seasonal allergies

## 2022-12-27 NOTE — ED ADULT TRIAGE NOTE - GLASGOW COMA SCALE: EYE OPENING, MLM
Shai Kent is a 69 y.o. male here to establish care and discuss chronic medical issues.  History of type 2 diabetes, hypertension, dyslipidemia.  Diabetes has been treated with metformin and glipizide. No follow up for about 1 year - last A1c 7.3 10/21  He has followed with cardiology once yearly. Nuclear stress test in Sept 2018 was normal. Echo in 2020 showed normal LVEF with mild AS    Current medicines (including changes today)  Current Outpatient Medications   Medication Sig Dispense Refill    amLODIPine (NORVASC) 5 MG Tab Take 5 mg by mouth every day.      metFORMIN (GLUCOPHAGE) 500 MG Tab Take 2 Tablets by mouth 2 times a day.      aspirin (ASA) 325 MG Tab Take 325 mg by mouth every 6 hours as needed.      BROMSITE 0.075 % Solution INSTILL 1 DROP IN POST OPERATIVE EYE ONCE DAILY      ezetimibe (ZETIA) 10 MG Tab Take 1 Tablet by mouth every day.      clobetasol (TEMOVATE) 0.05 % Ointment       glipiZIDE (GLUCOTROL) 5 MG Tab TAKE 1 TABLET BY MOUTH DAILY, WITH MEAL TIME ADMINISTRATION. NOT FOR BEDTIME ADMINISTRATION.      Ixekizumab (TALTZ) 80 MG/ML Solution Auto-injector Inject  under the skin Q30 DAYS.      lisinopril (PRINIVIL) 5 MG Tab Take 10 mg by mouth every day.      metoprolol SR (TOPROL XL) 50 MG TABLET SR 24 HR Take 1 Tablet by mouth every day.      rosuvastatin (CRESTOR) 20 MG Tab Take 1 Tablet by mouth every day.       No current facility-administered medications for this visit.     He  has a past medical history of Arthritis, Dental disorder, Diabetes (HCC), Heart burn, High cholesterol, and Hypertension.  He  has a past surgical history that includes arthroplasty (Left, 09/2017); hernia repair (03/2017); shoulder arthroscopy (Right, 06/2017); eye surgery (Right, 04/1999); and pr total knee arthroplasty (Right, 3/1/2022).  Social History     Tobacco Use    Smoking status: Never    Smokeless tobacco: Never   Vaping Use    Vaping Use: Never used   Substance Use Topics    Alcohol use: Yes      "Comment: 1-3 per week    Drug use: Never     Social History     Social History Narrative    Not on file     No family history on file.  No family status information on file.       ROS  See HPI     Objective:     Physical Exam:  /74 (BP Location: Right arm, Patient Position: Sitting, BP Cuff Size: Adult)   Pulse 90   Temp 36.4 °C (97.6 °F)   Resp 12   Ht 1.651 m (5' 5\")   Wt 87.3 kg (192 lb 6.4 oz)   SpO2 95%  Body mass index is 32.02 kg/m².  Constitutional: Alert. Well appearing. No distress.  Skin: Warm, dry, good turgor, no visible rashes.  Eye: Equal, round and reactive to light, conjunctiva clear, lids normal.  Neck: Trachea midline, no masses, no thyromegaly.   Respiratory: Normal effort. Lungs are clear to auscultation bilaterally.  Cardiovascular: Regular rate and rhythm. Normal S1/S2. 3/6 systolic murmur  Neuro: Moves all four extremities. No facial droop.  Psych: Answers questions appropriately. Normal affect and mood.    Assessment and Plan:     1. Type 2 diabetes mellitus without complication, without long-term current use of insulin (HCC)  No follow-up for about 1 year.  Last A1c 7.3% in 2021.  Recheck.  Continue metformin and glipizide for now.  Follow-up in 1 month.  - CBC WITH DIFFERENTIAL; Future  - Comp Metabolic Panel; Future  - HEMOGLOBIN A1C; Future  - Lipid Profile; Future  - MICROALBUMIN CREAT RATIO URINE; Future    2. Screen for colon cancer  Last colonoscopy over 10 years ago.  - Referral to GI for Colonoscopy    3. Cataract of both eyes, unspecified cataract type  Recent surgery on the left eye, has right eye surgery tomorrow.    4. Screening for prostate cancer  - PROSTATE SPECIFIC AG SCREENING; Future    5. Need for vaccination  - Influenza Vaccine, High Dose (65+ Only)    6. Mild aortic stenosis  Murmur on exam.  Had echo with mild AS in 2020.  Continues to see cardiology in [place].    Records requested from previous PCP.  Follow up: No follow-ups on file.         PLEASE " NOTE: This note was created using voice recognition software.    (E4) spontaneous

## 2022-12-27 NOTE — ED PROVIDER NOTE - RAPID ASSESSMENT
57F PSHx cholecystectomy presents to ED c/o abd pain x3days (worsens; waxing and waning pain today). Pt is experiencing diarrhea. Pt denies fevers.     The patient will be seen and further worked up in the main emergency department and their care will be completed by the main emergency department team along with a thorough physical exam. Receiving team will follow up on labs, analgesia, any clinical imaging, reassess and disposition as clinically indicated, all decisions regarding the progression of care will be made at their discretion. Seen by Felisa Montenegro (PA) and Maggie Maradiaga (Scribe). 57F PSHx cholecystectomy presents to ED c/o abd pain x3days (worsens; waxing and waning pain today). Pt is experiencing diarrhea. Pt denies fevers, + decreased PO intake, has never had similar pain.     I, IVAN Montenegro, personally performed the service described in the documentation recorded by the scribe in my presence, and it accurately and completely records my words and actions.     The patient will be seen and further worked up in the main emergency department and their care will be completed by the main emergency department team along with a thorough physical exam. Receiving team will follow up on labs, analgesia, any clinical imaging, reassess and disposition as clinically indicated, all decisions regarding the progression of care will be made at their discretion. Seen by Felisa Montenegro (PA) and Maggie Maradiaga (Chau).

## 2022-12-27 NOTE — ED PROVIDER NOTE - PROGRESS NOTE DETAILS
Alma Ricks PA: labs and imaging reviewed and discussed with patient. CT appreciated for possible gastritis and PUD. discussed results with patient. will send pepcid to pharmacy and have patient follow up with GI. strict return precautions discussed. stable for discharge. discussed with ED attending,

## 2022-12-27 NOTE — ED PROVIDER NOTE - NS ED ATTENDING STATEMENT MOD
This was a shared visit with the BEAN. I reviewed and verified the documentation and independently performed the documented:

## 2022-12-27 NOTE — ED PROVIDER NOTE - CLINICAL SUMMARY MEDICAL DECISION MAKING FREE TEXT BOX
Patient with abdominal pain and nausea with no vomiting or diarrhea. Concern for possible gastritis vs. colitis. Will check labs, perform CAT scan, do supportive care, and reassess.

## 2022-12-28 VITALS
DIASTOLIC BLOOD PRESSURE: 89 MMHG | OXYGEN SATURATION: 100 % | SYSTOLIC BLOOD PRESSURE: 155 MMHG | TEMPERATURE: 98 F | RESPIRATION RATE: 18 BRPM | HEART RATE: 62 BPM

## 2022-12-28 LAB
FLUAV AG NPH QL: SIGNIFICANT CHANGE UP
FLUBV AG NPH QL: SIGNIFICANT CHANGE UP
RSV RNA NPH QL NAA+NON-PROBE: SIGNIFICANT CHANGE UP
SARS-COV-2 RNA SPEC QL NAA+PROBE: SIGNIFICANT CHANGE UP

## 2022-12-28 PROCEDURE — 36415 COLL VENOUS BLD VENIPUNCTURE: CPT

## 2022-12-28 PROCEDURE — 96374 THER/PROPH/DIAG INJ IV PUSH: CPT

## 2022-12-28 PROCEDURE — 83690 ASSAY OF LIPASE: CPT

## 2022-12-28 PROCEDURE — 87637 SARSCOV2&INF A&B&RSV AMP PRB: CPT

## 2022-12-28 PROCEDURE — 85025 COMPLETE CBC W/AUTO DIFF WBC: CPT

## 2022-12-28 PROCEDURE — 85730 THROMBOPLASTIN TIME PARTIAL: CPT

## 2022-12-28 PROCEDURE — 74177 CT ABD & PELVIS W/CONTRAST: CPT | Mod: 26,MA

## 2022-12-28 PROCEDURE — 99284 EMERGENCY DEPT VISIT MOD MDM: CPT | Mod: 25

## 2022-12-28 PROCEDURE — 85610 PROTHROMBIN TIME: CPT

## 2022-12-28 PROCEDURE — 74177 CT ABD & PELVIS W/CONTRAST: CPT | Mod: MA

## 2022-12-28 PROCEDURE — 80053 COMPREHEN METABOLIC PANEL: CPT

## 2022-12-28 RX ORDER — FAMOTIDINE 10 MG/ML
1 INJECTION INTRAVENOUS
Qty: 14 | Refills: 0
Start: 2022-12-28 | End: 2023-01-03

## 2022-12-28 RX ORDER — FAMOTIDINE 10 MG/ML
20 INJECTION INTRAVENOUS ONCE
Refills: 0 | Status: COMPLETED | OUTPATIENT
Start: 2022-12-28 | End: 2022-12-28

## 2022-12-28 RX ADMIN — FAMOTIDINE 20 MILLIGRAM(S): 10 INJECTION INTRAVENOUS at 04:11

## 2022-12-28 NOTE — ED ADULT NURSE NOTE - OBJECTIVE STATEMENT
57y female PMH RA, fibromyalagia, HTN to the ED from home via triage c/o of abdominal pain. Pt reports 3 days of mid abdominal pain radiating up to the esophageal area. Pt reports nausea with no associated vomiting. Pt has some loose stools but denies having watery or bloody diarrhea. Pt has not been able to eat or drink much in the past few days because eating makes the pain worse. Pt taking normal RA/fibromyalgia meds for pain relief and is not getting any relief. Pt reports some discomfort with urination in the past few days in addition. Pt denies chest pain, palpitations, shortness of breath, headache, visual disturbances, numbness/tingling, fever, chills, diaphoresis. Stretcher in lowest position and locked, appropriate side rails in place, room cleared of clutter and safety hazards, call bell in reach- pt oriented to use, blankets given for comfort

## 2022-12-29 NOTE — ED POST DISCHARGE NOTE - DETAILS
Alma LOVE 12/29: spoke with patient to discuss importance of seeing GI for endoscopy to r/o lesion. patient in agreement with plan. strict return precautions discussed.

## 2022-12-30 ENCOUNTER — NON-APPOINTMENT (OUTPATIENT)
Age: 57
End: 2022-12-30

## 2023-01-03 ENCOUNTER — APPOINTMENT (OUTPATIENT)
Age: 58
End: 2023-01-03
Payer: COMMERCIAL

## 2023-01-03 VITALS
TEMPERATURE: 97.7 F | SYSTOLIC BLOOD PRESSURE: 120 MMHG | HEIGHT: 62 IN | OXYGEN SATURATION: 98 % | HEART RATE: 78 BPM | DIASTOLIC BLOOD PRESSURE: 80 MMHG | BODY MASS INDEX: 23.92 KG/M2 | WEIGHT: 130 LBS

## 2023-01-03 DIAGNOSIS — R93.3 ABNORMAL FINDINGS ON DIAGNOSTIC IMAGING OF OTHER PARTS OF DIGESTIVE TRACT: ICD-10-CM

## 2023-01-03 PROCEDURE — 99204 OFFICE O/P NEW MOD 45 MIN: CPT

## 2023-01-03 RX ORDER — OMEPRAZOLE 40 MG/1
40 CAPSULE, DELAYED RELEASE ORAL
Qty: 30 | Refills: 3 | Status: ACTIVE | COMMUNITY
Start: 2023-01-03 | End: 1900-01-01

## 2023-01-03 NOTE — ASSESSMENT
[FreeTextEntry1] : 57F, asthma, fibromyalgia, RA, s/p ccy,  here after recent ED visit for abd pain, nausea, diarrhea. No prior EGD. Colonoscopy last year at [facility]. No fhx of GI malignancy. \par \par \par # Abd pain\par - partial relief of abd pain w H2 blocker and complete resolution of nausea and diarrhea \par - ED CT abd/pelvis showing gastric wall thickening \par - d/c famotidine \par - start omeprazole 40mg po qday \par - avoid nsaids\par - schedule egd for intraluminal evaluation\par \par # Colon cancer screening \par No fhx of colon cancer\par Normal bowel movements \par Colonoscopy 2022 - Dr Owens in [place] - normal per patient. \par She will fax over report from her colonoscopy to this office. \par \par RTC after egd.

## 2023-01-03 NOTE — HISTORY OF PRESENT ILLNESS
[FreeTextEntry1] : 57F, asthma, fibromyalgia, RA, s/p ccy,  here for post ED visit follow up. \par \par Went to ED on 12/27/22 with epigastric abd pain x 3 days. \par Started after having a heavy meal on paris ulisses. \par + nausea, no vomiting. \par + diarrhea. \par No fevers. \par \par ED CT abd/pelvis --> distal gastric wall thickening/edema and minimal adjacent fatty infiltration \par Treated symptomatically and discharged home on pepcid. \par \par Here today to establish care. \par Has had partial response to pepcid but continues to have abd pain. \par Nausea resolved. No further diarrhea. \par Taking pepcid, prn pepto bismol. \par Takes NSAIDs very rarely. \par Denies fevers, chills, melena, hematochezia, n/v. \par \par Colonoscopy 2022 - Dr Owens in [place] - normal per patient. \par \par No fhx of GI malignancy. \par \par \par

## 2023-01-03 NOTE — PHYSICAL EXAM
[No Respiratory Distress] : no respiratory distress [No Acc Muscle Use] : no accessory muscle use [Abdomen Soft] : soft [Normal] : oriented to person, place, and time [de-identified] : +mild epigastric tenderness, no rebound or guarding

## 2023-01-04 LAB — SARS-COV-2 N GENE NPH QL NAA+PROBE: NOT DETECTED

## 2023-01-05 ENCOUNTER — APPOINTMENT (OUTPATIENT)
Dept: GASTROENTEROLOGY | Facility: AMBULATORY MEDICAL SERVICES | Age: 58
End: 2023-01-05
Payer: COMMERCIAL

## 2023-01-05 PROCEDURE — 43235 EGD DIAGNOSTIC BRUSH WASH: CPT

## 2023-01-05 RX ORDER — OMEPRAZOLE 40 MG/1
40 CAPSULE, DELAYED RELEASE ORAL TWICE DAILY
Qty: 60 | Refills: 2 | Status: ACTIVE | COMMUNITY
Start: 2023-01-05 | End: 1900-01-01

## 2023-01-26 ENCOUNTER — APPOINTMENT (OUTPATIENT)
Dept: GASTROENTEROLOGY | Facility: CLINIC | Age: 58
End: 2023-01-26
Payer: COMMERCIAL

## 2023-01-26 VITALS
HEIGHT: 62 IN | TEMPERATURE: 97.9 F | DIASTOLIC BLOOD PRESSURE: 80 MMHG | OXYGEN SATURATION: 95 % | HEART RATE: 96 BPM | SYSTOLIC BLOOD PRESSURE: 121 MMHG | WEIGHT: 130 LBS | BODY MASS INDEX: 23.92 KG/M2

## 2023-01-26 DIAGNOSIS — R10.13 EPIGASTRIC PAIN: ICD-10-CM

## 2023-01-26 DIAGNOSIS — K25.9 GASTRIC ULCER, UNSPECIFIED AS ACUTE OR CHRONIC, W/OUT HEMORRHAGE OR PERFORATION: ICD-10-CM

## 2023-01-26 PROCEDURE — 99213 OFFICE O/P EST LOW 20 MIN: CPT

## 2023-01-30 PROBLEM — R10.13 ABDOMINAL PAIN, EPIGASTRIC: Status: ACTIVE | Noted: 2023-01-03

## 2023-01-30 NOTE — ASSESSMENT
[FreeTextEntry1] : 57F, asthma, fibromyalgia, RA, s/p ccy, here after recent ED visit for abd pain, nausea, diarrhea. No prior EGD. Colonoscopy last year at [facility]. No fhx of GI malignancy. \par \par \par # Abd pain\par # Gastric ulcers \par - s/p EGD  - superficial and cratered nonbleeding ulcers in the antrum. Path = negative for h pylori \par - CT abd/pelvis showing gastric wall thickening \par - continue po ppi bid \par - avoid nsaids\par - schedule repeat egd in 2 mos to assess healing \par \par # Colon cancer screening \par No fhx of colon cancer\par Colonoscopy 2019 - Dr Velez - grade 1 internal hemorrhoids, otherwise normal colonoscpy. \par \par \par RTC after egd.

## 2023-01-30 NOTE — HISTORY OF PRESENT ILLNESS
[FreeTextEntry1] : Here for follow up visit. \par Feels that abd pain is improving.\par Taking PPI BID\par No nsaids. \par bowel movements are normal\par Denies n/v, hematochezia, melena, weight loss \par  \par \par

## 2023-03-22 LAB — SARS-COV-2 N GENE NPH QL NAA+PROBE: NOT DETECTED

## 2023-03-24 ENCOUNTER — TRANSCRIPTION ENCOUNTER (OUTPATIENT)
Age: 58
End: 2023-03-24

## 2023-03-24 ENCOUNTER — OUTPATIENT (OUTPATIENT)
Dept: OUTPATIENT SERVICES | Facility: HOSPITAL | Age: 58
LOS: 1 days | End: 2023-03-24
Payer: COMMERCIAL

## 2023-03-24 ENCOUNTER — RESULT REVIEW (OUTPATIENT)
Age: 58
End: 2023-03-24

## 2023-03-24 ENCOUNTER — APPOINTMENT (OUTPATIENT)
Dept: GASTROENTEROLOGY | Facility: HOSPITAL | Age: 58
End: 2023-03-24

## 2023-03-24 VITALS
WEIGHT: 132.06 LBS | HEART RATE: 72 BPM | OXYGEN SATURATION: 99 % | HEIGHT: 62 IN | SYSTOLIC BLOOD PRESSURE: 129 MMHG | TEMPERATURE: 97 F | DIASTOLIC BLOOD PRESSURE: 71 MMHG | RESPIRATION RATE: 19 BRPM

## 2023-03-24 VITALS
SYSTOLIC BLOOD PRESSURE: 117 MMHG | OXYGEN SATURATION: 100 % | HEART RATE: 69 BPM | DIASTOLIC BLOOD PRESSURE: 75 MMHG | RESPIRATION RATE: 20 BRPM

## 2023-03-24 DIAGNOSIS — Z98.89 OTHER SPECIFIED POSTPROCEDURAL STATES: Chronic | ICD-10-CM

## 2023-03-24 DIAGNOSIS — K25.9 GASTRIC ULCER, UNSPECIFIED AS ACUTE OR CHRONIC, WITHOUT HEMORRHAGE OR PERFORATION: ICD-10-CM

## 2023-03-24 PROCEDURE — 88305 TISSUE EXAM BY PATHOLOGIST: CPT | Mod: 26

## 2023-03-24 PROCEDURE — 43239 EGD BIOPSY SINGLE/MULTIPLE: CPT

## 2023-03-24 PROCEDURE — 88305 TISSUE EXAM BY PATHOLOGIST: CPT

## 2023-03-24 PROCEDURE — 94640 AIRWAY INHALATION TREATMENT: CPT

## 2023-03-24 RX ORDER — SODIUM CHLORIDE 9 MG/ML
500 INJECTION INTRAMUSCULAR; INTRAVENOUS; SUBCUTANEOUS
Refills: 0 | Status: COMPLETED | OUTPATIENT
Start: 2023-03-24 | End: 2023-03-24

## 2023-03-24 RX ORDER — LIDOCAINE HCL 20 MG/ML
4 VIAL (ML) INJECTION ONCE
Refills: 0 | Status: COMPLETED | OUTPATIENT
Start: 2023-03-24 | End: 2023-03-24

## 2023-03-24 RX ORDER — IPRATROPIUM/ALBUTEROL SULFATE 18-103MCG
3 AEROSOL WITH ADAPTER (GRAM) INHALATION ONCE
Refills: 0 | Status: COMPLETED | OUTPATIENT
Start: 2023-03-24 | End: 2023-03-24

## 2023-03-24 RX ADMIN — Medication 3 MILLILITER(S): at 10:05

## 2023-03-24 RX ADMIN — Medication 4 MILLILITER(S): at 10:05

## 2023-03-24 RX ADMIN — SODIUM CHLORIDE 30 MILLILITER(S): 9 INJECTION INTRAMUSCULAR; INTRAVENOUS; SUBCUTANEOUS at 10:36

## 2023-03-24 NOTE — ASU PATIENT PROFILE, ADULT - NSICDXPASTMEDICALHX_GEN_ALL_CORE_FT
PAST MEDICAL HISTORY:  Asthma     H/O fibromyalgia     Hypertension     Rheumatoid arthritis     Seasonal allergies

## 2023-03-24 NOTE — PRE-ANESTHESIA EVALUATION ADULT - NSANTHTIREDRD_ENT_A_CORE
"Chief Complaint   Patient presents with   • Knee Pain       Subjective   This patient returns the office with left knee pain and swelling.  Her symptoms have interfered with her walking.  Occasionally the knee feels like it locks.  Previously she was treated with steroid injection about one year ago.  I have reviewed and updated her medications, medical history and problem list during today's office visit.        Social History   Substance Use Topics   • Smoking status: Never Smoker   • Smokeless tobacco: Never Used   • Alcohol use No       Review of Systems   Musculoskeletal: Positive for arthralgias.       Objective   BP 94/59  Pulse 81  Temp 97 °F (36.1 °C)  Resp 16  Ht 62\" (157.5 cm)  Wt 197 lb (89.4 kg)  LMP  (LMP Unknown)  BMI 36.03 kg/m2  Body mass index is 36.03 kg/(m^2).  Physical Exam   Constitutional: She appears well-developed and well-nourished.   Musculoskeletal:        Left knee: She exhibits effusion. Tenderness found. Medial joint line tenderness noted.   Skin:   Incidental pigmented lesion on left anterior shin, see below   Vitals reviewed.          Data Reviewed:        Assessment/Plan     Problem List Items Addressed This Visit        Musculoskeletal and Integument    Arthropathy of knee - Primary    Relevant Orders    Ambulatory Referral to Orthopedic Surgery      Other Visit Diagnoses     Pigmented skin lesion        Relevant Orders    Ambulatory Referral to Dermatology          Outpatient Encounter Prescriptions as of 9/27/2017   Medication Sig Dispense Refill   • acetaminophen (TYLENOL) 325 MG tablet Take 2 tablets by mouth Every 6 (Six) Hours As Needed for mild pain (1-3) or fever.  0   • acyclovir (ZOVIRAX) 400 MG tablet TAKE 1 TABLET BY MOUTH 2 (TWO) TIMES A DAY. TAKE NO MORE THAN 5 DOSES A DAY. 60 tablet 8   • aspirin 81 MG tablet Take 81 mg by mouth Every Night.     • Bisoprolol Fumarate (ZEBETA PO) Take 15 mg by mouth Daily.     • carBAMazepine XR (TEGretol  XR) 200 MG 12 hr " tablet Take 2 tablets by mouth Every Night for 180 days. 180 tablet 1   • dexamethasone (DECADRON) 4 MG tablet TAKE 1 TABLET IN THE MORNING STARTING THE DAY AFTER CHEMO FOR 2 DAYS. TAKE WITH FOOD. (Patient taking differently: TAKE 1 TABLET IN THE MORNING STARTING THE DAY AFTER CHEMO FOR 2 DAYS. TAKE WITH FOOD BID) 16 tablet 1   • folic acid (FOLVITE) 1 MG tablet Take 1 mg by mouth Every Morning.     • hydrALAZINE (APRESOLINE) 25 MG tablet Take 25 mg by mouth 2 (two) times a day.  3   • isosorbide mononitrate (IMDUR) 60 MG 24 hr tablet Take 1 tablet by mouth Daily. TAKE 1 TABLET BY MOUTH EVERY MORNING AND ONE-HALF TABLET EVERY EVENING (Patient taking differently: Take 60 mg by mouth Daily. TAKE 1.5 TABLET BY MOUTH EVERY MORNING BEFORE EXERSIZE)     • losartan (COZAAR) 25 MG tablet Take 25 mg by mouth every evening.     • Misc. Devices (VIRAGE CUSTOM BREAST PROSTHES) misc As directed     • montelukast (SINGULAIR) 10 MG tablet TAKE 1 TABLET BY MOUTH EVERY NIGHT. 30 tablet 11     No facility-administered encounter medications on file as of 9/27/2017.        Orders Placed This Encounter   Procedures   • Ambulatory Referral to Orthopedic Surgery     Referral Priority:   Routine     Referral Type:   Consultation     Referral Reason:   Specialty Services Required     Referred to Provider:   Pam Medina MD     Requested Specialty:   Orthopedic Surgery     Number of Visits Requested:   1   • Ambulatory Referral to Dermatology     Referral Priority:   Routine     Referral Type:   Consultation     Referral Reason:   Specialty Services Required     Referred to Provider:   Cristo Guerra MD     Requested Specialty:   Dermatology     Number of Visits Requested:   1       Continue with current treatment plan.         RTC as needed or sooner if symptoms worsen or change   No

## 2023-03-24 NOTE — PRE PROCEDURE NOTE - PRE PROCEDURE EVALUATION
Attending Physician:        Thong Darling                     Procedure: upper endoscopy     Indication for Procedure: follow up of gastric ulcers   ________________________________________________________  PAST MEDICAL & SURGICAL HISTORY:  Seasonal allergies      Rheumatoid arthritis      H/O fibromyalgia      Asthma      Hypertension      History of         H/O bilateral breast reduction surgery          ALLERGIES:  No Known Allergies    HOME MEDICATIONS:  carisoprodol 350 mg oral tablet: 1 tab(s) orally 4 times a day, As Needed  cyclobenzaprine: 20  orally once a day, As Needed  Cymbalta 60 mg oral delayed release capsule: 1 cap(s) orally once a day  Kevzara 200 mg/1.14 mL subcutaneous solution: subcutaneous 2 times a month  leflunomide 20 mg oral tablet: 1 tab(s) orally once a day  losartan 100 mg oral tablet: 1 tab(s) orally once a day  Symbicort 80 mcg-4.5 mcg/inh inhalation aerosol: 2 puff(s) inhaled 2 times a day    AICD/PPM: [ ] yes   [x ] no    PERTINENT LAB DATA:                      PHYSICAL EXAMINATION:    Height (cm): 157.5  Weight (kg): 59.9  BMI (kg/m2): 24.1  BSA (m2): 1.6T(C): --  HR: --  BP: --  RR: --  SpO2: --    Constitutional: NAD  HEENT: PERRLA, EOMI,    Neck:  No JVD  Respiratory: CTAB/L  Cardiovascular: S1 and S2  Gastrointestinal: BS+, soft, NT/ND  Extremities: No peripheral edema  Neurological: A/O x 3, no focal deficits  Psychiatric: Normal mood, normal affect  Skin: No rashes    ASA Class: I [ ]  II [x ]  III [ ]  IV [ ]    COMMENTS:    The patient is a suitable candidate for the planned procedure unless box checked [ ]  No, explain:

## 2023-03-28 LAB — SURGICAL PATHOLOGY STUDY: SIGNIFICANT CHANGE UP

## 2023-04-12 ENCOUNTER — NON-APPOINTMENT (OUTPATIENT)
Age: 58
End: 2023-04-12

## 2023-07-17 PROBLEM — I10 ESSENTIAL (PRIMARY) HYPERTENSION: Chronic | Status: ACTIVE | Noted: 2023-03-24

## 2023-08-25 ENCOUNTER — APPOINTMENT (OUTPATIENT)
Dept: VASCULAR SURGERY | Facility: CLINIC | Age: 58
End: 2023-08-25
Payer: COMMERCIAL

## 2023-08-25 VITALS
HEIGHT: 62 IN | TEMPERATURE: 98 F | OXYGEN SATURATION: 98 % | WEIGHT: 140 LBS | HEART RATE: 78 BPM | DIASTOLIC BLOOD PRESSURE: 82 MMHG | BODY MASS INDEX: 25.76 KG/M2 | RESPIRATION RATE: 16 BRPM | SYSTOLIC BLOOD PRESSURE: 122 MMHG

## 2023-08-25 PROCEDURE — 99203 OFFICE O/P NEW LOW 30 MIN: CPT

## 2023-08-25 NOTE — HISTORY OF PRESENT ILLNESS
[FreeTextEntry1] : I just had the pleasure of seeing Mrs. Chand in consultation from Dr. Arthur for lower extremity pain.  Mrs. Chand is a tamar 58 year old lady who presents with pain of the knee joints. She reports that she underwent an ultrasound that showed right knee effusion. Ultrasound of the left knee is pending. She complains of knee pain when climbing stairs. She denies any symptoms consistent with lower extremity claudication, rest pain or tissue loss. She denies any lower extremity varicose veins, edema, skin pigmentation changes or ulcers. She has not had any prior vascular surgical intervention on her lower extremities. She denies any history of CAD, MI, CHF, CVA, TIA, CRI or DM.  Her medical history is otherwise significant for cholelithiasis, RA, fibromyalgia, HTN, and PUD.  Medications: Cymbalta, Leflunomide, Losartan, KevZara, and Carisoprodol.  All: NKDA  SH: non-smoker. Works in real-estate.   FH: [place]

## 2023-08-25 NOTE — ASSESSMENT
[FreeTextEntry1] : In summary, Mrs. Chand presents with knee pain and effusion. She has no evidence of arterial or venous insufficiency per history and exam. I instructed her to follow up with orthopedics.

## 2023-08-25 NOTE — PHYSICAL EXAM
[de-identified] : On physical exam, the patient is in no acute distress and neurologically intact. The lungs are clear to auscultation and the heart has a regular rate and rhythm. Abdomen is benign. Bilateral femoral and pedal pulses are palpable. No lower extremity edema, skin changes or wounds present. No prominent varicosities present.

## 2023-08-29 ENCOUNTER — APPOINTMENT (OUTPATIENT)
Dept: GASTROENTEROLOGY | Facility: CLINIC | Age: 58
End: 2023-08-29
Payer: COMMERCIAL

## 2023-08-29 VITALS
BODY MASS INDEX: 25.4 KG/M2 | SYSTOLIC BLOOD PRESSURE: 119 MMHG | DIASTOLIC BLOOD PRESSURE: 78 MMHG | HEIGHT: 62 IN | OXYGEN SATURATION: 98 % | HEART RATE: 76 BPM | WEIGHT: 138 LBS

## 2023-08-29 DIAGNOSIS — R10.32 LEFT LOWER QUADRANT PAIN: ICD-10-CM

## 2023-08-29 PROCEDURE — 99213 OFFICE O/P EST LOW 20 MIN: CPT

## 2023-08-29 RX ORDER — POLYETHYLENE GLYCOL 3350 17 G/17G
17 POWDER, FOR SOLUTION ORAL DAILY
Qty: 510 | Refills: 0 | Status: ACTIVE | COMMUNITY
Start: 2023-08-29 | End: 1900-01-01

## 2023-08-31 NOTE — ASSESSMENT
[FreeTextEntry1] : 58F, asthma, fibromyalgia, RA, s/p ccy, following for gastric ulcers, here today w LLQ abd pain. EGD/Colon as below. No fhx of GI malignancy.  # LLQ pain, new  - may have component of constipation causing pain - start miralax bowel regimen - ct abd/pelvis to evaluate pain  - if imaging negative, will consider repeat cscope   # Gastric ulcers - s/p EGD Jan 2023 - superficial and cratered nonbleeding ulcers in the antrum. Path = negative for h pylori - repeat EGD march 2023 - normal esophagus, mild erythema in gastric body, normal duodenum  - avoid nsaids  # Colon cancer screening No fhx of colon cancer Colonoscopy 2019 - Dr Velez - grade 1 internal hemorrhoids, otherwise normal colonoscpy.  RTC after imaging

## 2023-08-31 NOTE — HISTORY OF PRESENT ILLNESS
[FreeTextEntry1] : Here for f/u visit  Reports LLQ abdominal pain x several weeks Episodic Worse w eating  + bloating + appetite loss  Denies n/v, fevers, chills, weight loss   Bowel movements -- once a day +straining  No blood in stool    Saw GYN, s/p w/u w TVUS and bloodwork -- all normal per patient

## 2023-09-13 ENCOUNTER — APPOINTMENT (OUTPATIENT)
Dept: ORTHOPEDIC SURGERY | Facility: CLINIC | Age: 58
End: 2023-09-13
Payer: COMMERCIAL

## 2023-09-13 VITALS — HEIGHT: 62 IN | WEIGHT: 138 LBS | BODY MASS INDEX: 25.4 KG/M2

## 2023-09-13 DIAGNOSIS — M22.2X2 PATELLOFEMORAL DISORDERS, RIGHT KNEE: ICD-10-CM

## 2023-09-13 DIAGNOSIS — M76.899 OTHER SPECIFIED ENTHESOPATHIES OF UNSPECIFIED LOWER LIMB, EXCLUDING FOOT: ICD-10-CM

## 2023-09-13 DIAGNOSIS — M76.30 ILIOTIBIAL BAND SYNDROME, UNSPECIFIED LEG: ICD-10-CM

## 2023-09-13 DIAGNOSIS — M79.7 FIBROMYALGIA: ICD-10-CM

## 2023-09-13 DIAGNOSIS — M06.9 RHEUMATOID ARTHRITIS, UNSPECIFIED: ICD-10-CM

## 2023-09-13 DIAGNOSIS — M22.2X1 PATELLOFEMORAL DISORDERS, RIGHT KNEE: ICD-10-CM

## 2023-09-13 PROCEDURE — 99203 OFFICE O/P NEW LOW 30 MIN: CPT | Mod: 25

## 2023-09-13 PROCEDURE — 73564 X-RAY EXAM KNEE 4 OR MORE: CPT | Mod: 50

## 2023-09-13 NOTE — ASU PREOP CHECKLIST - HAND OFF
Pt returns to ED after leaving AMA approximately one hour ago, states she is not having suicidal thoughts [facility] because they want to send me to a mental hospital if I say I do. \" Pt states she returned specifically due to increased SOB after leaving
yes

## 2023-09-29 ENCOUNTER — NON-APPOINTMENT (OUTPATIENT)
Age: 58
End: 2023-09-29

## 2023-10-06 ENCOUNTER — NON-APPOINTMENT (OUTPATIENT)
Age: 58
End: 2023-10-06

## 2023-10-06 ENCOUNTER — APPOINTMENT (OUTPATIENT)
Dept: OPHTHALMOLOGY | Facility: CLINIC | Age: 58
End: 2023-10-06
Payer: COMMERCIAL

## 2023-10-06 PROCEDURE — 92004 COMPRE OPH EXAM NEW PT 1/>: CPT

## 2023-10-23 ENCOUNTER — NON-APPOINTMENT (OUTPATIENT)
Age: 58
End: 2023-10-23

## 2023-11-20 ENCOUNTER — APPOINTMENT (OUTPATIENT)
Dept: ORTHOPEDIC SURGERY | Facility: CLINIC | Age: 58
End: 2023-11-20

## 2023-11-28 ENCOUNTER — APPOINTMENT (OUTPATIENT)
Dept: GASTROENTEROLOGY | Facility: CLINIC | Age: 58
End: 2023-11-28

## 2023-12-08 ENCOUNTER — APPOINTMENT (OUTPATIENT)
Dept: OPHTHALMOLOGY | Facility: CLINIC | Age: 58
End: 2023-12-08

## 2024-01-25 NOTE — ASU PREOP CHECKLIST - WEIGHT IN LBS
- went to  x2 for this  - second time went got     -within last 3months  -first started on right shin   - given abx and steroid   -came back 3 weeks     -tried triamcinalone and hydrocortisone - didn't stop the itching    - no history of eczema  -no fhx of eczema    -itches  -sore from itching    -no changes in lifestyle  -no new medications    Subjective   HPI: Mary Mayers is a 54 y.o. female is a new patient here for evaluation and treatment of pruritic rash.   - went to  x2 for this  - second time went got     -within last 3months  -first started on right shin   - given abx and steroid   -came back 3 weeks     -tried triamcinalone and hydrocortisone - didn't stop the itching    - no history of eczema  -no fhx of eczema    -itches  -sore from itching    -no changes in lifestyle  -no new medications    ROS: No other skin or systemic complaints other than what is documented elsewhere in the note.    ALLERGIES: Fish containing products and Lisinopril    SOCIAL:  reports that she has quit smoking. Her smoking use included cigarettes. She has quit using smokeless tobacco. She reports that she does not currently use alcohol. She reports that she does not use drugs.    Specialty Problems          Dermatology Problems    Excess skin of abdomen    Laxity of skin       Objective   Left Flank, Right Lower Leg - Anterior  Well-circumscribed acute eczematous dermatitis        Assessment/Plan   1. Other atopic dermatitis  Right Lower Leg - Anterior; Left Flank    Discussed with patient that clinically this appears as eczema patches.  To help with the pruritus I recommended IM Kenalog 60.  For topical treatment I recommended augmented betamethasone.  Discussed with patient that she is to use this medication until symptoms resolve and then 1 week after.      Related Medications  triamcinolone acetonide (Kenalog-40) injection 60 mg      betamethasone, augmented, (Diprolene AF) 0.05 % cream  Apply topically once daily.  Apply a thin layer to affected area once daily. DO NOT USE ON FACE OR GROIN.         FOLLOW UP: 4 weeks    The patient was encouraged to contact me with any further questions or concerns.  Xiao Barragan PA-C  1/28/2024         145.9

## 2024-03-11 ENCOUNTER — INPATIENT (INPATIENT)
Facility: HOSPITAL | Age: 59
LOS: 0 days | Discharge: HOME | DRG: 69 | End: 2024-03-12
Attending: PSYCHIATRY & NEUROLOGY | Admitting: PSYCHIATRY & NEUROLOGY
Payer: COMMERCIAL

## 2024-03-11 VITALS
SYSTOLIC BLOOD PRESSURE: 182 MMHG | DIASTOLIC BLOOD PRESSURE: 108 MMHG | TEMPERATURE: 98 F | OXYGEN SATURATION: 99 % | RESPIRATION RATE: 16 BRPM | WEIGHT: 143.08 LBS | HEIGHT: 62 IN | HEART RATE: 78 BPM

## 2024-03-11 DIAGNOSIS — Z98.89 OTHER SPECIFIED POSTPROCEDURAL STATES: Chronic | ICD-10-CM

## 2024-03-11 DIAGNOSIS — R53.1 WEAKNESS: ICD-10-CM

## 2024-03-11 LAB
ALBUMIN SERPL ELPH-MCNC: 4.8 G/DL — SIGNIFICANT CHANGE UP (ref 3.3–5)
ALP SERPL-CCNC: 72 U/L — SIGNIFICANT CHANGE UP (ref 40–120)
ALT FLD-CCNC: 23 U/L — SIGNIFICANT CHANGE UP (ref 10–45)
ANION GAP SERPL CALC-SCNC: 11 MMOL/L — SIGNIFICANT CHANGE UP (ref 5–17)
APTT BLD: 25.5 SEC — SIGNIFICANT CHANGE UP (ref 24.5–35.6)
AST SERPL-CCNC: 21 U/L — SIGNIFICANT CHANGE UP (ref 10–40)
BASOPHILS # BLD AUTO: 0.04 K/UL — SIGNIFICANT CHANGE UP (ref 0–0.2)
BASOPHILS NFR BLD AUTO: 0.9 % — SIGNIFICANT CHANGE UP (ref 0–2)
BILIRUB SERPL-MCNC: 0.3 MG/DL — SIGNIFICANT CHANGE UP (ref 0.2–1.2)
BUN SERPL-MCNC: 14 MG/DL — SIGNIFICANT CHANGE UP (ref 7–23)
CALCIUM SERPL-MCNC: 9.5 MG/DL — SIGNIFICANT CHANGE UP (ref 8.4–10.5)
CHLORIDE SERPL-SCNC: 107 MMOL/L — SIGNIFICANT CHANGE UP (ref 96–108)
CO2 SERPL-SCNC: 25 MMOL/L — SIGNIFICANT CHANGE UP (ref 22–31)
CREAT SERPL-MCNC: 0.88 MG/DL — SIGNIFICANT CHANGE UP (ref 0.5–1.3)
EGFR: 77 ML/MIN/1.73M2 — SIGNIFICANT CHANGE UP
EOSINOPHIL # BLD AUTO: 0.14 K/UL — SIGNIFICANT CHANGE UP (ref 0–0.5)
EOSINOPHIL NFR BLD AUTO: 3.2 % — SIGNIFICANT CHANGE UP (ref 0–6)
GLUCOSE SERPL-MCNC: 120 MG/DL — HIGH (ref 70–99)
HCT VFR BLD CALC: 41.8 % — SIGNIFICANT CHANGE UP (ref 34.5–45)
HGB BLD-MCNC: 14.1 G/DL — SIGNIFICANT CHANGE UP (ref 11.5–15.5)
IMM GRANULOCYTES NFR BLD AUTO: 0.5 % — SIGNIFICANT CHANGE UP (ref 0–0.9)
INR BLD: 0.94 RATIO — SIGNIFICANT CHANGE UP (ref 0.85–1.18)
LYMPHOCYTES # BLD AUTO: 1.64 K/UL — SIGNIFICANT CHANGE UP (ref 1–3.3)
LYMPHOCYTES # BLD AUTO: 38 % — SIGNIFICANT CHANGE UP (ref 13–44)
MCHC RBC-ENTMCNC: 29.4 PG — SIGNIFICANT CHANGE UP (ref 27–34)
MCHC RBC-ENTMCNC: 33.7 GM/DL — SIGNIFICANT CHANGE UP (ref 32–36)
MCV RBC AUTO: 87.3 FL — SIGNIFICANT CHANGE UP (ref 80–100)
MONOCYTES # BLD AUTO: 0.52 K/UL — SIGNIFICANT CHANGE UP (ref 0–0.9)
MONOCYTES NFR BLD AUTO: 12 % — SIGNIFICANT CHANGE UP (ref 2–14)
NEUTROPHILS # BLD AUTO: 1.96 K/UL — SIGNIFICANT CHANGE UP (ref 1.8–7.4)
NEUTROPHILS NFR BLD AUTO: 45.4 % — SIGNIFICANT CHANGE UP (ref 43–77)
NRBC # BLD: 0 /100 WBCS — SIGNIFICANT CHANGE UP (ref 0–0)
PLATELET # BLD AUTO: 220 K/UL — SIGNIFICANT CHANGE UP (ref 150–400)
POTASSIUM SERPL-MCNC: 4.6 MMOL/L — SIGNIFICANT CHANGE UP (ref 3.5–5.3)
POTASSIUM SERPL-SCNC: 4.6 MMOL/L — SIGNIFICANT CHANGE UP (ref 3.5–5.3)
PROT SERPL-MCNC: 7.5 G/DL — SIGNIFICANT CHANGE UP (ref 6–8.3)
PROTHROM AB SERPL-ACNC: 10.4 SEC — SIGNIFICANT CHANGE UP (ref 9.5–13)
RBC # BLD: 4.79 M/UL — SIGNIFICANT CHANGE UP (ref 3.8–5.2)
RBC # FLD: 13.3 % — SIGNIFICANT CHANGE UP (ref 10.3–14.5)
SODIUM SERPL-SCNC: 143 MMOL/L — SIGNIFICANT CHANGE UP (ref 135–145)
TROPONIN T, HIGH SENSITIVITY RESULT: <6 NG/L — SIGNIFICANT CHANGE UP (ref 0–51)
WBC # BLD: 4.32 K/UL — SIGNIFICANT CHANGE UP (ref 3.8–10.5)
WBC # FLD AUTO: 4.32 K/UL — SIGNIFICANT CHANGE UP (ref 3.8–10.5)

## 2024-03-11 PROCEDURE — 70450 CT HEAD/BRAIN W/O DYE: CPT | Mod: 26,MC,59

## 2024-03-11 PROCEDURE — 0042T: CPT | Mod: MC

## 2024-03-11 PROCEDURE — 70496 CT ANGIOGRAPHY HEAD: CPT | Mod: 26,MC

## 2024-03-11 PROCEDURE — 99285 EMERGENCY DEPT VISIT HI MDM: CPT

## 2024-03-11 PROCEDURE — 70498 CT ANGIOGRAPHY NECK: CPT | Mod: 26,MC

## 2024-03-11 RX ORDER — AMLODIPINE BESYLATE 2.5 MG/1
1 TABLET ORAL
Refills: 0 | DISCHARGE

## 2024-03-11 RX ORDER — CYCLOBENZAPRINE HYDROCHLORIDE 10 MG/1
20 TABLET, FILM COATED ORAL
Qty: 0 | Refills: 0 | DISCHARGE

## 2024-03-11 RX ORDER — ALPRAZOLAM 0.25 MG
2 TABLET ORAL
Refills: 0 | DISCHARGE

## 2024-03-11 RX ORDER — LOSARTAN POTASSIUM 100 MG/1
1 TABLET, FILM COATED ORAL
Qty: 0 | Refills: 0 | DISCHARGE

## 2024-03-11 RX ORDER — DULOXETINE HYDROCHLORIDE 30 MG/1
1 CAPSULE, DELAYED RELEASE ORAL
Qty: 0 | Refills: 0 | DISCHARGE

## 2024-03-11 RX ORDER — LEFLUNOMIDE 10 MG/1
1 TABLET ORAL
Qty: 0 | Refills: 0 | DISCHARGE

## 2024-03-11 RX ORDER — ASPIRIN/CALCIUM CARB/MAGNESIUM 324 MG
81 TABLET ORAL DAILY
Refills: 0 | Status: DISCONTINUED | OUTPATIENT
Start: 2024-03-11 | End: 2024-03-11

## 2024-03-11 RX ORDER — BUDESONIDE AND FORMOTEROL FUMARATE DIHYDRATE 160; 4.5 UG/1; UG/1
2 AEROSOL RESPIRATORY (INHALATION)
Refills: 0 | DISCHARGE

## 2024-03-11 RX ORDER — CARISOPRODOL 250 MG
1 TABLET ORAL
Qty: 0 | Refills: 0 | DISCHARGE

## 2024-03-11 RX ORDER — ENOXAPARIN SODIUM 100 MG/ML
40 INJECTION SUBCUTANEOUS EVERY 24 HOURS
Refills: 0 | Status: DISCONTINUED | OUTPATIENT
Start: 2024-03-11 | End: 2024-03-12

## 2024-03-11 RX ORDER — SODIUM CHLORIDE 9 MG/ML
1000 INJECTION INTRAMUSCULAR; INTRAVENOUS; SUBCUTANEOUS
Refills: 0 | Status: DISCONTINUED | OUTPATIENT
Start: 2024-03-11 | End: 2024-03-12

## 2024-03-11 RX ORDER — ACETAMINOPHEN 500 MG
975 TABLET ORAL ONCE
Refills: 0 | Status: COMPLETED | OUTPATIENT
Start: 2024-03-11 | End: 2024-03-11

## 2024-03-11 RX ORDER — ASPIRIN/CALCIUM CARB/MAGNESIUM 324 MG
300 TABLET ORAL DAILY
Refills: 0 | Status: DISCONTINUED | OUTPATIENT
Start: 2024-03-11 | End: 2024-03-12

## 2024-03-11 RX ORDER — SARILUMAB 150 MG/1.14ML
0 INJECTION, SOLUTION SUBCUTANEOUS
Qty: 0 | Refills: 0 | DISCHARGE

## 2024-03-11 RX ORDER — FLUTICASONE PROPIONATE 50 MCG
1 SPRAY, SUSPENSION NASAL
Refills: 0 | Status: DISCONTINUED | OUTPATIENT
Start: 2024-03-11 | End: 2024-03-12

## 2024-03-11 RX ORDER — BUDESONIDE AND FORMOTEROL FUMARATE DIHYDRATE 160; 4.5 UG/1; UG/1
2 AEROSOL RESPIRATORY (INHALATION)
Qty: 0 | Refills: 0 | DISCHARGE

## 2024-03-11 RX ORDER — DESVENLAFAXINE 50 MG/1
1 TABLET, EXTENDED RELEASE ORAL
Refills: 0 | DISCHARGE

## 2024-03-11 RX ORDER — ATORVASTATIN CALCIUM 80 MG/1
80 TABLET, FILM COATED ORAL AT BEDTIME
Refills: 0 | Status: DISCONTINUED | OUTPATIENT
Start: 2024-03-11 | End: 2024-03-11

## 2024-03-11 RX ORDER — CARISOPRODOL 250 MG
1 TABLET ORAL
Refills: 0 | DISCHARGE

## 2024-03-11 RX ADMIN — Medication 1 SPRAY(S): at 23:40

## 2024-03-11 RX ADMIN — Medication 300 MILLIGRAM(S): at 18:41

## 2024-03-11 NOTE — ED ADULT NURSE NOTE - NSFALLRISKINTERV_ED_ALL_ED

## 2024-03-11 NOTE — H&P ADULT - TIME BILLING
A total of 110 minutes were dedicated to the comprehensive evaluation of the patient, encompassing the review of neuroimaging, laboratory tests, and physical examination. Additionally, time was allocated to educate her about secondary stroke prevention measures and to address all her inquiries to the best of my ability.    After considering risk factors being a women with migraine, hyperlipidemia and hormone therapy (Tibolone) and event not characteristic of her previous migraines, along with negative MR of the brain, diagnosis favors transient ischemic attack can not completely exclude complicated migraine with transient neurological symptoms. In this setting benefit of initiating aspirin therapy for secondary stroke prevention measures outweigh risks.    Cholesterol 222-Atorvastatin 80mg follow up by PCP  Echocardiogram no evidence of significant valvular disease or thrombus  MRV negative for thombosis  CTA no evidence of significant intracranial or extracranial atherosclerotic disease  ABCD 2 score of 2  She was  in discontinue hormone therapy and seek advice from her obgyn.

## 2024-03-11 NOTE — ED PROVIDER NOTE - PHYSICAL EXAMINATION
Const: Well-nourished, Well-developed, appearing stated age.  Eyes: no conjunctival injection, and symmetrical lids.  HEENT: Head NCAT, no lesions. Atraumatic external nose and ears. Moist MM.  Neck: Symmetric, trachea midline.   RESP: Unlabored respiratory effort.   GI: Nontender/Nondistended,  MSK: Normocephalic/Atraumatic, Lower Extremities w/o calf tenderness or edema b/l.   Skin: Warm, dry and intact.   Neuro: CNs II-XII intact. Left leg weakness.  No pronator drift.  Normal finger-to-nose.  Psych: Awake, Alert, & Oriented (AAO) x3. Appropriate mood and affect.

## 2024-03-11 NOTE — H&P ADULT - NSHPPHYSICALEXAM_GEN_ALL_CORE
Constitutional: well-developed, well-nourished, well-groomed  Cardiovascular: no swelling, warm-to-touch    - Mental Status: Eyes open, attends to examiner; oriented to person, age, month, year, location, and situation; speech is fluent with intact naming, intact repetition, and follows commands.    - Cranial Nerves: II: Visual fields are full to confrontation; pupils are equal, round, and reactive to light. III, IV, VI: Extraocular movements are intact without nystagmus. V: Facial sensation is diminished to temperature in the L V1-V3 distribution; intact on the R V1-V3. VII: Face is symmetric with normal eye closure and smile. VIII: Hearing is intact to conversation and finger rub. IX, X: Uvula is midline and soft palate rises symmetrically. XI: Shoulder shrug intact. XII: Tongue protrudes in the midline    - Motor/Strength Testing:  - No drifts in bilateral upper extremities  - Mild (1+) drift in LLE  - No drift in RLE                                   Right           Left  Deltoid                     5                 5  Biceps                      5                 4+  Triceps                     5                 4+  Wrist Ext (radial)       5                 4+  Hand                  5                 5    Hip Flex                   5                  4+  Knee Ext	      5                  4+  Dorsiflex                  5                  5  Plantarflex               5                  4+    - There is no pronator drift. Normal muscle bulk and tone throughout. Somewhat tremulous in upper extremities (patient attributes to the cold temperature).    - Reflexes:   Bicep (C5/C6):                  R 2+ --- L 2+ (brisk b/l)  Brachioradialis (C5/C6) :   R 2+ --- L 2+ (brisk b/l)  Patella (L3/L4) :                 R 3+ --- L 4+ (suprapatellar present on R; 3 beats clonus on average on L)  Ankle (S1) :                       R 2+ --- L 2+ (brisk b/l)    - Plant responses down on the R, neutral-to-down on the L  - Sensory: Intact throughout to temperature b/l UE and RLE and most of LLE. Diminished to temperature in posterior aspect of distal LLE (between calf and ankle).  - Coordination: Finger-nose-finger intact without ataxia or dysmetria.    - Gait: Mildly hemiparetic appearing gait, although ambulates without assistance.

## 2024-03-11 NOTE — H&P ADULT - NSHPLABSRESULTS_GEN_ALL_CORE
03-11    143  |  107  |  14  ----------------------------<  120<H>  4.6   |  25  |  0.88    Ca    9.5      11 Mar 2024 10:22    TPro  7.5  /  Alb  4.8  /  TBili  0.3  /  DBili  x   /  AST  21  /  ALT  23  /  AlkPhos  72  03-11                            14.1   4.32  )-----------( 220      ( 11 Mar 2024 10:22 )             41.8       PT/INR - ( 11 Mar 2024 10:22 )   PT: 10.4 sec;   INR: 0.94 ratio         PTT - ( 11 Mar 2024 10:22 )  PTT:25.5 sec    LIVER FUNCTIONS - ( 11 Mar 2024 10:22 )  Alb: 4.8 g/dL / Pro: 7.5 g/dL / ALK PHOS: 72 U/L / ALT: 23 U/L / AST: 21 U/L / GGT: x             Urinalysis Basic - ( 11 Mar 2024 10:22 )    Color: x / Appearance: x / SG: x / pH: x  Gluc: 120 mg/dL / Ketone: x  / Bili: x / Urobili: x   Blood: x / Protein: x / Nitrite: x   Leuk Esterase: x / RBC: x / WBC x   Sq Epi: x / Non Sq Epi: x / Bacteria: x    -----  CT Brain Stroke Protocol w/ IV Cont (03.11.24 @ 10:37)    IMPRESSION: Mild small vessel white matter ischemic changes without   significant change from 6/7/2019. Normal CT perfusion. Normal CTA of the   head and neck. 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

## 2024-03-11 NOTE — H&P ADULT - HISTORY OF PRESENT ILLNESS
58-year-old right-handed female w/ PMHx HTN (uncontrolled, non-adherent to amlodipine), asthma, RA, fibromyalgia, distant h/o migraines, no h/o stroke, non-smoker, who presents to [facility] ED due to headache followed by left body numbness and weakness. Patient reports that she woke up this morning at 04:00 AM 3/11/24 with a severe headache affecting her right forehead, difficult for her to characterize, not associated with photophobia, nausea, or vomiting. She remained in bed unable to fall back to sleep. At about 07:00 AM, she felt an "electric" sensation throughout her left body. She has had this "electric" type of sensation in the past, which she attributes to fibromyalgia, however, it is usually in "patches," rather than affecting her entire unilateral body. This electric sensation was followed by her noticing that her left face (pointing to V1-V3) felt numb and maybe a little "tight." She subsequently had left upper extremity numbness, followed by left lower extremity numbness. It is difficult for her to report how long it took for this sensation to migrate from face to lower extremity, but seemingly less than one minute. She eventually stood up to get out of bed, which is when she noticed that she had weakness in her LLE associated with difficulty ambulating. She also endorsed that she had some LUE weakness at this time as well. Patient took four aspirin 81 mg tablets (aspirin 325 mg x 1) prior to coming to the ED. She reports that she noticed some difficulty swallowing her AM medications, including the aspirin. She has never had the symptoms above in the past.     Code stroke called 10:17 AM 3/11/24 upon arrival to the ED. At that time, patient reporting at least 50% improvement of her symptoms. On arrival, her headache is only "slight." She denies changes in speech, vision, hearing, voice quality, or balance/room-spinning sensations.     Regarding migraine history, she reports having "very bad" migraine headaches in the past, but she has not had a migraine headache since she was 36 years old. 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 (Again, history is unclear.) She states that she was apparently worked up for multiple sclerosis about 10 years ago by a neurologist due to an unusual finding by an optometrist or ophthalmologist. She denies having had MRIs at that time as part of her MS workup. Patient had a lumbar puncture at that time, which was reportedly unrevealing. She denies history of vision loss in the past. She also denies past symptoms of focal neurological deficits, like numbness or weakness. She also reports history of cervical and lumbosacral pathology, described as herniations.     Patient admits to non-adherence to her amlodipine. Per patient and her partner at bedside, patient's blood pressure is often elevated at home. SBP > 160 and DBP > 100 is not unusual. She has been told many times in the past to adhere to her BP medication. She denies any adverse effect from amlodipine, only stating that she does not always like to take her medication. 58-year-old right-handed female w/ PMHx HTN (uncontrolled, non-adherent to amlodipine), asthma, RA, gastric ulcers, fibromyalgia, distant h/o migraines, no h/o stroke, non-smoker, not on antithrombotics, who presents to [facility] ED due to headache followed by left body numbness and weakness. Patient reports that she woke up this morning at 04:00 AM 3/11/24 with a severe headache affecting her right forehead, difficult for her to characterize, not associated with photophobia, nausea, or vomiting. She remained in bed unable to fall back to sleep. At about 07:00 AM, she felt an "electric" sensation throughout her left body. She has had this "electric" type of sensation in the past, which she attributes to fibromyalgia, however, it is usually in "patches," rather than affecting her entire unilateral body. This electric sensation was followed by her noticing that her left face (pointing to V1-V3) felt numb and maybe a little "tight." She subsequently had left upper extremity numbness, followed by left lower extremity numbness. It is difficult for her to report how long it took for this sensation to migrate from face to lower extremity, but seemingly less than one minute. She eventually stood up to get out of bed, which is when she noticed that she had weakness in her LLE associated with difficulty ambulating. She also endorsed that she had some LUE weakness at this time as well. Patient took four aspirin 81 mg tablets (aspirin 325 mg x 1) prior to coming to the ED. She reports that she noticed some difficulty swallowing her AM medications, including the aspirin. She has never had the symptoms above in the past.     Code stroke called 10:17 AM 3/11/24 upon arrival to the ED. At that time, patient reporting at least 50% improvement of her symptoms. On arrival, her headache is only "slight." She denies changes in speech, vision, hearing, voice quality, or balance/room-spinning sensations.     Regarding migraine history, she reports having "very bad" migraine headaches in the past, but she has not had a migraine headache since she was 36 years old. 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 (Again, history is unclear.) She states that she was apparently worked up for multiple sclerosis about 10 years ago by a neurologist due to an unusual finding by an optometrist or ophthalmologist. She denies having had MRIs at that time as part of her MS workup. Patient had a lumbar puncture at that time, which was reportedly unrevealing. She denies history of vision loss in the past. She also denies past symptoms of focal neurological deficits, like numbness or weakness. She also reports history of cervical and lumbosacral pathology, described as herniations.     Patient admits to non-adherence to her amlodipine. Per patient and her partner at bedside, patient's blood pressure is often elevated at home. SBP > 160 and DBP > 100 is not unusual. She has been told many times in the past to adhere to her BP medication. She denies any adverse effect from amlodipine, only stating that she does not always like to take her medication.

## 2024-03-11 NOTE — CONSULT NOTE ADULT - SUBJECTIVE AND OBJECTIVE BOX
Neurology - Consult Note - 24  -  Tevin Andrea MD  PGY-4 Neurology  Spectra: [phone] ([facility]), [phone] ([facility])  -    Name: PARAS FRANK; 58y (1965)    Reason for Admission:     Chief Complaint:     HPI: 58-year-old ***-handed female w/ PMHx HTN, asthma, RA, fibromyalgia, denies h/o migraines, w/ FMHx *** who presents to [facility] ED due to       Review of Systems:  INCOMPLETE   CONSTITUTIONAL: No fevers or chills  EYES/ENT: No visual changes or no throat pain   NECK: No pain or stiffness  RESPIRATORY: No hemoptysis or shortness of breath  CARDIOVASCULAR: No chest pain or palpitations  GASTROINTESTINAL: No melena or hematochezia  GENITOURINARY: No dysuria or hematuria  NEUROLOGICAL: +As stated in HPI above  SKIN: No itching, burning, rashes, or lesions   All other review of systems is negative unless indicated above.    Allergies:  No Known Allergies      PMHx:   Seasonal allergies  Rheumatoid arthritis  H/O fibromyalgia  Asthma  Hypertension      PFHx:   Family history of diabetes mellitus (DM) (Father, Grandparent)  Family history of hypertension (Mother)      PSuHx:   History of   H/O bilateral breast reduction surgery        Medications:  MEDICATIONS  (STANDING):    MEDICATIONS  (PRN):      Vitals:  T(C): 36.8 (24 @ 10:11), Max: 36.8 (24 @ 10:11)  HR: 78 (24 @ 10:11) (78 - 78)  BP: 182/108 (24 @ 10:11) (182/108 - 182/108)  RR: 16 (24 @ 10:11) (16 - 16)  SpO2: 99% (24 @ 10:11) (99% - 99%)    Physical Examination: INCOMPLETE  ***    Labs:          CAPILLARY BLOOD GLUCOSE      POCT Blood Glucose.: 104 mg/dL (11 Mar 2024 10:16)      Radiology:     Neurology - Consult Note - 24  -  Tevin Andrea MD  PGY-4 Neurology  Spectra: [phone] ([facility]), [phone] ([facility])  -    Name: PARAS FRANK; 58y (1965)    Reason for Admission:     Chief Complaint:     HPI: 58-year-old right-handed female w/ PMHx HTN, asthma, RA, fibromyalgia, distant h/o migraines, who presents to [facility] ED due to       Review of Systems:  INCOMPLETE   CONSTITUTIONAL: No fevers or chills  EYES/ENT: No visual changes or no throat pain   NECK: No pain or stiffness  RESPIRATORY: No hemoptysis or shortness of breath  CARDIOVASCULAR: No chest pain or palpitations  GASTROINTESTINAL: No melena or hematochezia  GENITOURINARY: No dysuria or hematuria  NEUROLOGICAL: +As stated in HPI above  SKIN: No itching, burning, rashes, or lesions   All other review of systems is negative unless indicated above.    Allergies:  No Known Allergies      PMHx:   Seasonal allergies  Rheumatoid arthritis  H/O fibromyalgia  Asthma  Hypertension      PFHx:   Family history of diabetes mellitus (DM) (Father, Grandparent)  Family history of hypertension (Mother)      PSuHx:   History of   H/O bilateral breast reduction surgery        Medications:  MEDICATIONS  (STANDING):    MEDICATIONS  (PRN):      Vitals:  T(C): 36.8 (24 @ 10:11), Max: 36.8 (24 @ 10:11)  HR: 78 (24 @ 10:11) (78 - 78)  BP: 182/108 (24 @ 10:11) (182/108 - 182/108)  RR: 16 (24 @ 10:11) (16 - 16)  SpO2: 99% (24 @ 10:11) (99% - 99%)    Physical Examination: INCOMPLETE  ***    Labs:          CAPILLARY BLOOD GLUCOSE      POCT Blood Glucose.: 104 mg/dL (11 Mar 2024 10:16)      Radiology:     Neurology - Consult Note - 24  -  Tevin Andrea MD  PGY-4 Neurology  Spectra: [phone] ([facility]), [phone] ([facility])  -    Name: PARAS FRANK; 58y (1965)    Reason for Admission:     Chief Complaint:     HPI: 58-year-old right-handed female w/ PMHx HTN (uncontrolled, non-adherent to amlodipine), asthma, RA, fibromyalgia, distant h/o migraines, no h/o stroke, non-smoker, who presents to [facility] ED due to headache followed by left body numbness and weakness. Patient reports that she woke up this morning at 04:00 AM 3/11/24 with a severe headache affecting her right forehead, difficult for her to characterize, not associated with photophobia, nausea, or vomiting. She remained in bed unable to fall back to sleep. At about 07:00 AM, she felt an "electric" sensation throughout her left body. She has had this "electric" type of sensation in the past, which she attributes to fibromyalgia, however, it is usually in "patches," rather than affecting her entire unilateral body. This electric sensation was followed by her noticing that her left face (pointing to V1-V3) felt numb and maybe a little "tight." She subsequently had left upper extremity numbness, followed by left lower extremity numbness. It is difficult for her to report how long it took for this sensation to migrate from face to lower extremity, but seemingly less than one minute. She eventually stood up to get out of bed, which is when she noticed that she had weakness in her LLE associated with difficulty ambulating. She also endorsed that she had some LUE weakness at this time as well. Patient took four aspirin 81 mg tablets (aspirin 325 mg x 1) prior to coming to the ED. She reports that she noticed some difficulty swallowing her AM medications, including the aspirin. She has never had the symptoms above in the past.     Code stroke called 10:17 AM 3/11/24 upon arrival to the ED. At that time, patient reporting at least 50% improvement of her symptoms. On arrival, her headache is only "slight." She denies changes in speech, vision, hearing, voice quality, or balance/room-spinning sensations.     Regarding migraine history, she reports having "very bad" migraine headaches in the past, but she has not had a migraine headache since she was 36 years old. 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 (Again, history is unclear.) She states that she was apparently worked up for multiple sclerosis about 10 years ago by a neurologist due to an unusual finding by an optometrist or ophthalmologist. Patient apparently had a lumbar puncture at this time, which was reportedly unrevealing. She denies history of vision loss in the past. She also denies past symptoms of focal neurological deficits, like numbness or weakness.      Review of Systems:  INCOMPLETE   CONSTITUTIONAL: No fevers or chills  EYES/ENT: No visual changes or no throat pain   NECK: No pain or stiffness  RESPIRATORY: No hemoptysis or shortness of breath  CARDIOVASCULAR: No chest pain or palpitations  GASTROINTESTINAL: No melena or hematochezia  GENITOURINARY: No dysuria or hematuria  NEUROLOGICAL: +As stated in HPI above  SKIN: No itching, burning, rashes, or lesions   All other review of systems is negative unless indicated above.    Allergies:  No Known Allergies      PMHx:   Seasonal allergies  Rheumatoid arthritis  H/O fibromyalgia  Asthma  Hypertension      PFHx:   Family history of diabetes mellitus (DM) (Father, Grandparent)  Family history of hypertension (Mother)      PSuHx:   History of   H/O bilateral breast reduction surgery        Medications:  MEDICATIONS  (STANDING):    MEDICATIONS  (PRN):      Vitals:  T(C): 36.8 (24 @ 10:11), Max: 36.8 (24 @ 10:11)  HR: 78 (24 @ 10:11) (78 - 78)  BP: 182/108 (24 @ 10:11) (182/108 - 182/108)  RR: 16 (24 @ 10:11) (16 - 16)  SpO2: 99% (24 @ 10:11) (99% - 99%)    Physical Examination: INCOMPLETE  ***    Labs:          CAPILLARY BLOOD GLUCOSE      POCT Blood Glucose.: 104 mg/dL (11 Mar 2024 10:16)      Radiology:     Neurology - Consult Note - 24  -  Tevin Andrea MD  PGY-4 Neurology  Spectra: [phone] ([facility]), [phone] ([facility])  -    Name: PARAS FRANK; 58y (1965)    Reason for Admission:     Chief Complaint:     HPI: 58-year-old right-handed female w/ PMHx HTN (uncontrolled, non-adherent to amlodipine), asthma, RA, fibromyalgia, distant h/o migraines, no h/o stroke, non-smoker, who presents to [facility] ED due to headache followed by left body numbness and weakness. Patient reports that she woke up this morning at 04:00 AM 3/11/24 with a severe headache affecting her right forehead, difficult for her to characterize, not associated with photophobia, nausea, or vomiting. She remained in bed unable to fall back to sleep. At about 07:00 AM, she felt an "electric" sensation throughout her left body. She has had this "electric" type of sensation in the past, which she attributes to fibromyalgia, however, it is usually in "patches," rather than affecting her entire unilateral body. This electric sensation was followed by her noticing that her left face (pointing to V1-V3) felt numb and maybe a little "tight." She subsequently had left upper extremity numbness, followed by left lower extremity numbness. It is difficult for her to report how long it took for this sensation to migrate from face to lower extremity, but seemingly less than one minute. She eventually stood up to get out of bed, which is when she noticed that she had weakness in her LLE associated with difficulty ambulating. She also endorsed that she had some LUE weakness at this time as well. Patient took four aspirin 81 mg tablets (aspirin 325 mg x 1) prior to coming to the ED. She reports that she noticed some difficulty swallowing her AM medications, including the aspirin. She has never had the symptoms above in the past.     Code stroke called 10:17 AM 3/11/24 upon arrival to the ED. At that time, patient reporting at least 50% improvement of her symptoms. On arrival, her headache is only "slight." She denies changes in speech, vision, hearing, voice quality, or balance/room-spinning sensations.     Regarding migraine history, she reports having "very bad" migraine headaches in the past, but she has not had a migraine headache since she was 36 years old. 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 (Again, history is unclear.) She states that she was apparently worked up for multiple sclerosis about 10 years ago by a neurologist due to an unusual finding by an optometrist or ophthalmologist. Patient apparently had a lumbar puncture at this time, which was reportedly unrevealing. She denies history of vision loss in the past. She also denies past symptoms of focal neurological deficits, like numbness or weakness. She also reports history of cervical and lumbosacral pathology, described as herniations.       Review of Systems:     CONSTITUTIONAL: No fevers or chills  EYES/ENT: No visual changes or no throat pain   NECK: No new pain or stiffness  RESPIRATORY: No hemoptysis or shortness of breath  CARDIOVASCULAR: No chest pain or palpitations  NEUROLOGICAL: +As stated in HPI above  SKIN: No itching, burning, rashes, or lesions   All other review of systems is negative unless indicated above.    Allergies:  No Known Allergies      PMHx:   Seasonal allergies  Rheumatoid arthritis  H/O fibromyalgia  Asthma  Hypertension      PFHx:   Family history of diabetes mellitus (DM) (Father, Grandparent)  Family history of hypertension (Mother)      PSuHx:   History of   H/O bilateral breast reduction surgery        Medications:  MEDICATIONS  (STANDING):    MEDICATIONS  (PRN):      Vitals:  T(C): 36.8 (24 @ 10:11), Max: 36.8 (24 @ 10:11)  HR: 78 (24 @ 10:11) (78 - 78)  BP: 182/108 (24 @ 10:11) (182/108 - 182/108)  RR: 16 (24 @ 10:11) (16 - 16)  SpO2: 99% (24 @ 10:11) (99% - 99%)    Physical Examination:      Constitutional: well-developed, well-nourished, well-groomed  Cardiovascular: no swelling, warm-to-touch    Neurological Examination:    - Mental Status: Eyes open, attends to examiner; oriented to person, age, month, year, location, and situation; speech is fluent with intact naming, intact repetition, and follows commands.    - Cranial Nerves:  II: Visual fields are full to confrontation; pupils are equal, round, and reactive to light   III, IV, VI: Extraocular movements are intact without nystagmus  V: Facial sensation is diminished to temperature in the L V1-V3 distribution; intact on the R V1-V3.  VII: Face is symmetric with normal eye closure and smile  VIII: Hearing is intact to conversation and finger rub.  IX, X: Uvula is midline and soft palate rises symmetrically  XI: Shoulder shrug intact  XII: Tongue protrudes in the midline    - Motor/Strength Testing:  - No drifts in bilateral upper extremities  - Mild (1+) drift in LLE  - No drift in RLE                                   Right           Left  Deltoid                     5                 5  Biceps                      5                 4+  Triceps                     5                 4+  Wrist Ext (radial)       5                 4+  Hand                  5                 5    Hip Flex                   5                  4+  Knee Ext	      5                  4+  Dorsiflex                  5                  5  Plantarflex               5                  4+    - There is no pronator drift.   - Normal muscle bulk and tone throughout.  - Somewhat tremulous in upper extremities (patient attributes to the cold temperature)    - Reflexes:   Bicep (C5/C6):                  R 2+ --- L 2+ (brisk b/l)  Brachioradialis (C5/C6) :   R 2+ --- L 2+ (brisk b/l)  Patella (L3/L4) :                 R 3+ --- L 4+ (suprapatellar present on R; 3 beats clonus on average on L)  Ankle (S1) :                       R 2+ --- L 2+ (brisk b/l)    - Plant responses down on the R, neutral-to-down on the L    - Sensory: Intact throughout to temperature b/l UE and RLE and most of LLE. Diminished to temperature in posterior aspect of distal LLE (between calf and ankle).  - Coordination: Finger-nose-finger intact without ataxia or dysmetria.    - Gait: Mildly hemiparetic appearing gait, although ambulates without assistance.          Labs:          CAPILLARY BLOOD GLUCOSE      POCT Blood Glucose.: 104 mg/dL (11 Mar 2024 10:16)      Radiology:     Neurology - Consult Note - 24  -  Tevin Andrea MD  PGY-4 Neurology  Spectra: [phone] ([facility]), [phone] ([facility])  -    Name: PARAS FRANK; 58y (1965)    Reason for Admission:     Chief Complaint:     HPI: 58-year-old right-handed female w/ PMHx HTN (uncontrolled, non-adherent to amlodipine), asthma, RA, fibromyalgia, distant h/o migraines, no h/o stroke, non-smoker, who presents to [facility] ED due to headache followed by left body numbness and weakness. Patient reports that she woke up this morning at 04:00 AM 3/11/24 with a severe headache affecting her right forehead, difficult for her to characterize, not associated with photophobia, nausea, or vomiting. She remained in bed unable to fall back to sleep. At about 07:00 AM, she felt an "electric" sensation throughout her left body. She has had this "electric" type of sensation in the past, which she attributes to fibromyalgia, however, it is usually in "patches," rather than affecting her entire unilateral body. This electric sensation was followed by her noticing that her left face (pointing to V1-V3) felt numb and maybe a little "tight." She subsequently had left upper extremity numbness, followed by left lower extremity numbness. It is difficult for her to report how long it took for this sensation to migrate from face to lower extremity, but seemingly less than one minute. She eventually stood up to get out of bed, which is when she noticed that she had weakness in her LLE associated with difficulty ambulating. She also endorsed that she had some LUE weakness at this time as well. Patient took four aspirin 81 mg tablets (aspirin 325 mg x 1) prior to coming to the ED. She reports that she noticed some difficulty swallowing her AM medications, including the aspirin. She has never had the symptoms above in the past.     Code stroke called 10:17 AM 3/11/24 upon arrival to the ED. At that time, patient reporting at least 50% improvement of her symptoms. On arrival, her headache is only "slight." She denies changes in speech, vision, hearing, voice quality, or balance/room-spinning sensations.     Regarding migraine history, she reports having "very bad" migraine headaches in the past, but she has not had a migraine headache since she was 36 years old. 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 (Again, history is unclear.) She states that she was apparently worked up for multiple sclerosis about 10 years ago by a neurologist due to an unusual finding by an optometrist or ophthalmologist. She denies having had MRIs at that time as part of her MS workup. Patient had a lumbar puncture at that time, which was reportedly unrevealing. She denies history of vision loss in the past. She also denies past symptoms of focal neurological deficits, like numbness or weakness. She also reports history of cervical and lumbosacral pathology, described as herniations.       Review of Systems:     CONSTITUTIONAL: No fevers or chills  EYES/ENT: No visual changes or no throat pain   NECK: No new pain or stiffness  RESPIRATORY: No hemoptysis or shortness of breath  CARDIOVASCULAR: No chest pain or palpitations  NEUROLOGICAL: +As stated in HPI above  SKIN: No itching, burning, rashes, or lesions   All other review of systems is negative unless indicated above.    Allergies:  No Known Allergies      PMHx:   Seasonal allergies  Rheumatoid arthritis  H/O fibromyalgia  Asthma  Hypertension      PFHx:   Family history of diabetes mellitus (DM) (Father, Grandparent)  Family history of hypertension (Mother)      PSuHx:   History of   H/O bilateral breast reduction surgery        Medications:  MEDICATIONS  (STANDING):    MEDICATIONS  (PRN):      Vitals:  T(C): 36.8 (24 @ 10:11), Max: 36.8 (24 @ 10:11)  HR: 78 (24 @ 10:11) (78 - 78)  BP: 182/108 (24 @ 10:11) (182/108 - 182/108)  RR: 16 (24 @ 10:11) (16 - 16)  SpO2: 99% (24 @ 10:11) (99% - 99%)    Physical Examination:      Constitutional: well-developed, well-nourished, well-groomed  Cardiovascular: no swelling, warm-to-touch    Neurological Examination:    - Mental Status: Eyes open, attends to examiner; oriented to person, age, month, year, location, and situation; speech is fluent with intact naming, intact repetition, and follows commands.    - Cranial Nerves:  II: Visual fields are full to confrontation; pupils are equal, round, and reactive to light   III, IV, VI: Extraocular movements are intact without nystagmus  V: Facial sensation is diminished to temperature in the L V1-V3 distribution; intact on the R V1-V3.  VII: Face is symmetric with normal eye closure and smile  VIII: Hearing is intact to conversation and finger rub.  IX, X: Uvula is midline and soft palate rises symmetrically  XI: Shoulder shrug intact  XII: Tongue protrudes in the midline    - Motor/Strength Testing:  - No drifts in bilateral upper extremities  - Mild (1+) drift in LLE  - No drift in RLE                                   Right           Left  Deltoid                     5                 5  Biceps                      5                 4+  Triceps                     5                 4+  Wrist Ext (radial)       5                 4+  Hand                  5                 5    Hip Flex                   5                  4+  Knee Ext	      5                  4+  Dorsiflex                  5                  5  Plantarflex               5                  4+    - There is no pronator drift.   - Normal muscle bulk and tone throughout.  - Somewhat tremulous in upper extremities (patient attributes to the cold temperature)    - Reflexes:   Bicep (C5/C6):                  R 2+ --- L 2+ (brisk b/l)  Brachioradialis (C5/C6) :   R 2+ --- L 2+ (brisk b/l)  Patella (L3/L4) :                 R 3+ --- L 4+ (suprapatellar present on R; 3 beats clonus on average on L)  Ankle (S1) :                       R 2+ --- L 2+ (brisk b/l)    - Plant responses down on the R, neutral-to-down on the L    - Sensory: Intact throughout to temperature b/l UE and RLE and most of LLE. Diminished to temperature in posterior aspect of distal LLE (between calf and ankle).  - Coordination: Finger-nose-finger intact without ataxia or dysmetria.    - Gait: Mildly hemiparetic appearing gait, although ambulates without assistance.          Labs:          CAPILLARY BLOOD GLUCOSE      POCT Blood Glucose.: 104 mg/dL (11 Mar 2024 10:16)      Radiology:    CT Brain Stroke Protocol w/ IV Cont (24 @ 10:37)    IMPRESSION: Mild small vessel white matter ischemic changes without   significant change from 2019. Normal CT perfusion. Normal CTA of the   head and neck. 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

## 2024-03-11 NOTE — ED PROVIDER NOTE - ADMIT DISPOSITION PRESENT ON ADMISSION SEPSIS
PNV  20w4d    Patient denies any lof, vb or ctx  Patient has +fm  Patient urine is trace pro/neg glu   Patient has no concerns today  No

## 2024-03-11 NOTE — ED PROVIDER NOTE - ATTENDING CONTRIBUTION TO CARE
code stroke called in triage  attending Lashon: 58yF h/o fibromyalgia and HTN presents with L sided weakness since 7am. Assoc with headache and photophobia. Exam as above. Will obtain labs, STAT CT imaging and neuro eval, reassess

## 2024-03-11 NOTE — H&P ADULT - NSHPREVIEWOFSYSTEMS_GEN_ALL_CORE
CONSTITUTIONAL: No fevers or chills  EYES/ENT: No visual changes or no throat pain   NECK: No new pain or stiffness  RESPIRATORY: No hemoptysis or shortness of breath  CARDIOVASCULAR: No chest pain or palpitations  NEUROLOGICAL: +As stated in HPI above  SKIN: No itching, burning, rashes, or lesions   All other review of systems is negative unless indicated above.

## 2024-03-11 NOTE — H&P ADULT - ASSESSMENT
Summary: 58 RHF w/ PMHx HTN (uncontrolled, non-adherent to amlodipine), distant h/o migraine (last migraine > 20 years ago), anxiety/depression, fibromyalgia, RA, non-smoker, no h/o stroke, presenting d/t HA followed by left body sensorimotor symptoms. Patient woke up 04:00 AM 3/11 w/ severe L frontal headache, followed by L face numbness, then LUE numbness, then LLE numbness, subsequently finding that she had LLE weakness upon ambulating at home. Unclear timing regarding spread of numbness from face to leg, possibly occurring over the course of 1 minute. She took  mg PO at home x 1. On arrival to ED, patient reporting at least 50% improvement of above symptoms. However, she failed dysphagia in ED, also reporting some difficulty swallowing (pointing to left throat) her home medications prior to arrival. Initial VS in ED: /108, HR 78, afebrile.     mRS: 0  LKN: 07:00 AM 3/11/24  NIHSS: 2 (LLE drift, mild-moderate sensory loss)    Not a tenecteplase candidate due to isolated neurological deficits and non-disabling deficits.  Not a mechanical thrombectomy candidate due to no LVO.    Impression: Severe, unilateral HA, followed by left sensorimotor symptoms, rapidly migrating from face to leg, w/ marked subjective improvement. Symptoms localize to right brain dysfunction, possibly central sulcus or thalamocapsular region. Differential diagnosis include migraine with aura vs acute ischemic stroke.     Plan:    Disposition: Admit to stroke floor (not unit) for q4h - patient failed dysphagia, acute ischemic stroke on the differential.    Diagnostics:  [] MRI brain without contrast   [] TTE w/ bubble   [] Implantable loop recorder  [] Lipid panel, HbA1c  [] CBC, CMP, coagulation panel, troponin    Medications:  [] ASA 81 mg PO (325 per rectum if fails dysphagia) -- starting 3/12/24 (patient took  mg x1 prior to arrival)  [] Clopidogrel 75 mg QD x 21 days (final plan to be determined after MRI brain; will hold while NPO)  [] Atorvastatin 80mg (titrate to LDL < 70)   [] Lovenox SQ for DVT prophylaxis     Miscellaneous:  [] Bilateral sequential compression device  [] NPO unless passes dysphagia screen - failed in ED; therefore, swallow evaluation  [] Keep BP permissive up to 220/110 for 48 hours followed by gradual normotension over 2-3 days   [] Telemonitoring  [] Neurological checks and vital signs per unit protocol  [] BGM goals 140-180  [] PT/OT; S/S evaluation  [x] Discussed importance of taking her anti-hypertension medications. Patient states that she understands and will adhere to her BP medications going forward.    * Case and plan not final until Attending attestation *   Summary: 58 RHF w/ PMHx HTN (uncontrolled, non-adherent to amlodipine), distant h/o migraine (last migraine > 20 years ago), anxiety/depression, gastric ulcers, fibromyalgia, RA, non-smoker, no h/o stroke, not on antithrombotics, presenting d/t HA followed by left body sensorimotor symptoms. Patient woke up 04:00 AM 3/11 w/ severe L frontal headache, followed by L face numbness, then LUE numbness, then LLE numbness, subsequently finding that she had LLE weakness upon ambulating at home. Unclear timing regarding spread of numbness from face to leg, possibly occurring over the course of 1 minute. She took  mg PO at home x 1. On arrival to ED, patient reporting at least 50% improvement of above symptoms. However, she failed dysphagia in ED, also reporting some difficulty swallowing (pointing to left throat) her home medications prior to arrival. Initial VS in ED: /108, HR 78, afebrile.     mRS: 0  LKN: 07:00 AM 3/11/24  NIHSS: 2 (LLE drift, mild-moderate sensory loss)    Not a tenecteplase candidate due to isolated neurological deficits and non-disabling deficits.  Not a mechanical thrombectomy candidate due to no LVO.    Impression: Severe, unilateral HA, followed by left sensorimotor symptoms, rapidly migrating from face to leg, w/ marked subjective improvement. Symptoms localize to right brain dysfunction, possibly central sulcus or thalamocapsular region. Differential diagnosis include migraine with aura vs acute ischemic stroke.     Plan:    Disposition: Admit to stroke floor (not unit) for q4h - patient failed dysphagia, acute ischemic stroke on the differential.    Diagnostics:  [] MRI brain without contrast   [] TTE w/ bubble   [] Implantable loop recorder  [] Lipid panel, HbA1c  [] CBC, CMP, coagulation panel, troponin    Medications:  [] ASA 81 mg PO (325 per rectum if fails dysphagia) -- starting 3/12/24 (patient took  mg x1 prior to arrival)  [] Clopidogrel 75 mg QD x 21 days (final plan to be determined after MRI brain; will hold while NPO)  [] Atorvastatin 80mg (titrate to LDL < 70)   [] Lovenox SQ for DVT prophylaxis     Miscellaneous:  [] Bilateral sequential compression device  [] NPO unless passes dysphagia screen - failed in ED; therefore, swallow evaluation  [] Keep BP permissive up to 220/110 for 48 hours followed by gradual normotension over 2-3 days   [] Telemonitoring  [] Neurological checks and vital signs per unit protocol  [] BGM goals 140-180  [] PT/OT; S/S evaluation  [x] Discussed importance of taking her anti-hypertension medications. Patient states that she understands and will adhere to her BP medications going forward.    * Case and plan not final until Attending attestation *

## 2024-03-11 NOTE — ED PROVIDER NOTE - CLINICAL SUMMARY MEDICAL DECISION MAKING FREE TEXT BOX
58-year-old female history of fibromyalgia and hypertension presenting to the ED as a code stroke for left-sided weakness including arm and lower extremity that began at about 7 AM.  Patient states she woke up out of sleep at about 4 AM with a headache and few hours later began to feel weakness.  Denying any nausea, vomiting, fevers, chills no chest pain.  No blurred vision or visual changes.  will get ct's for code stroke.

## 2024-03-11 NOTE — ED PROVIDER NOTE - OBJECTIVE STATEMENT
58-year-old female history of fibromyalgia and hypertension presenting to the ED as a code stroke for left-sided weakness including arm and lower extremity that began at about 7 AM.  Patient states she woke up out of sleep at about 4 AM with a headache and few hours later began to feel weakness.  Denying any nausea, vomiting, fevers, chills no chest pain.  No blurred vision or visual changes.

## 2024-03-11 NOTE — ED ADULT TRIAGE NOTE - CHIEF COMPLAINT QUOTE
headache since 4am, numbness to L side of face, L arm and L leg since 7am. LKW 11pm.   PMH of fibromyalgia

## 2024-03-12 ENCOUNTER — RESULT REVIEW (OUTPATIENT)
Age: 59
End: 2024-03-12

## 2024-03-12 ENCOUNTER — TRANSCRIPTION ENCOUNTER (OUTPATIENT)
Age: 59
End: 2024-03-12

## 2024-03-12 VITALS — HEART RATE: 80 BPM | DIASTOLIC BLOOD PRESSURE: 85 MMHG | SYSTOLIC BLOOD PRESSURE: 146 MMHG | OXYGEN SATURATION: 97 %

## 2024-03-12 DIAGNOSIS — R49.0 DYSPHONIA: ICD-10-CM

## 2024-03-12 LAB
A1C WITH ESTIMATED AVERAGE GLUCOSE RESULT: 5.6 % — SIGNIFICANT CHANGE UP (ref 4–5.6)
ADD ON TEST-SPECIMEN IN LAB: SIGNIFICANT CHANGE UP
ANION GAP SERPL CALC-SCNC: 12 MMOL/L — SIGNIFICANT CHANGE UP (ref 5–17)
BUN SERPL-MCNC: 10 MG/DL — SIGNIFICANT CHANGE UP (ref 7–23)
CALCIUM SERPL-MCNC: 9.7 MG/DL — SIGNIFICANT CHANGE UP (ref 8.4–10.5)
CHLORIDE SERPL-SCNC: 105 MMOL/L — SIGNIFICANT CHANGE UP (ref 96–108)
CHOLEST SERPL-MCNC: 222 MG/DL — HIGH
CO2 SERPL-SCNC: 23 MMOL/L — SIGNIFICANT CHANGE UP (ref 22–31)
CREAT SERPL-MCNC: 0.8 MG/DL — SIGNIFICANT CHANGE UP (ref 0.5–1.3)
CRP SERPL-MCNC: <3 MG/L — SIGNIFICANT CHANGE UP (ref 0–4)
EGFR: 86 ML/MIN/1.73M2 — SIGNIFICANT CHANGE UP
ERYTHROCYTE [SEDIMENTATION RATE] IN BLOOD: 2 MM/HR — SIGNIFICANT CHANGE UP (ref 0–20)
ESTIMATED AVERAGE GLUCOSE: 114 MG/DL — SIGNIFICANT CHANGE UP (ref 68–114)
FLUAV AG NPH QL: SIGNIFICANT CHANGE UP
FLUBV AG NPH QL: SIGNIFICANT CHANGE UP
GLUCOSE SERPL-MCNC: 88 MG/DL — SIGNIFICANT CHANGE UP (ref 70–99)
HCT VFR BLD CALC: 40.6 % — SIGNIFICANT CHANGE UP (ref 34.5–45)
HDLC SERPL-MCNC: 59 MG/DL — SIGNIFICANT CHANGE UP
HGB BLD-MCNC: 13.5 G/DL — SIGNIFICANT CHANGE UP (ref 11.5–15.5)
LIPID PNL WITH DIRECT LDL SERPL: 139 MG/DL — HIGH
MCHC RBC-ENTMCNC: 29.4 PG — SIGNIFICANT CHANGE UP (ref 27–34)
MCHC RBC-ENTMCNC: 33.3 GM/DL — SIGNIFICANT CHANGE UP (ref 32–36)
MCV RBC AUTO: 88.5 FL — SIGNIFICANT CHANGE UP (ref 80–100)
NON HDL CHOLESTEROL: 162 MG/DL — HIGH
NRBC # BLD: 0 /100 WBCS — SIGNIFICANT CHANGE UP (ref 0–0)
PLATELET # BLD AUTO: 219 K/UL — SIGNIFICANT CHANGE UP (ref 150–400)
POTASSIUM SERPL-MCNC: 4 MMOL/L — SIGNIFICANT CHANGE UP (ref 3.5–5.3)
POTASSIUM SERPL-SCNC: 4 MMOL/L — SIGNIFICANT CHANGE UP (ref 3.5–5.3)
RBC # BLD: 4.59 M/UL — SIGNIFICANT CHANGE UP (ref 3.8–5.2)
RBC # FLD: 13.6 % — SIGNIFICANT CHANGE UP (ref 10.3–14.5)
RSV RNA NPH QL NAA+NON-PROBE: SIGNIFICANT CHANGE UP
SARS-COV-2 RNA SPEC QL NAA+PROBE: SIGNIFICANT CHANGE UP
SODIUM SERPL-SCNC: 140 MMOL/L — SIGNIFICANT CHANGE UP (ref 135–145)
TRIGL SERPL-MCNC: 131 MG/DL — SIGNIFICANT CHANGE UP
WBC # BLD: 5.24 K/UL — SIGNIFICANT CHANGE UP (ref 3.8–10.5)
WBC # FLD AUTO: 5.24 K/UL — SIGNIFICANT CHANGE UP (ref 3.8–10.5)

## 2024-03-12 PROCEDURE — 82947 ASSAY GLUCOSE BLOOD QUANT: CPT

## 2024-03-12 PROCEDURE — 80048 BASIC METABOLIC PNL TOTAL CA: CPT

## 2024-03-12 PROCEDURE — 85652 RBC SED RATE AUTOMATED: CPT

## 2024-03-12 PROCEDURE — 82803 BLOOD GASES ANY COMBINATION: CPT

## 2024-03-12 PROCEDURE — 70545 MR ANGIOGRAPHY HEAD W/DYE: CPT | Mod: 26

## 2024-03-12 PROCEDURE — 93306 TTE W/DOPPLER COMPLETE: CPT

## 2024-03-12 PROCEDURE — 70450 CT HEAD/BRAIN W/O DYE: CPT | Mod: MC

## 2024-03-12 PROCEDURE — 70551 MRI BRAIN STEM W/O DYE: CPT | Mod: MC

## 2024-03-12 PROCEDURE — 74230 X-RAY XM SWLNG FUNCJ C+: CPT | Mod: 26

## 2024-03-12 PROCEDURE — 84484 ASSAY OF TROPONIN QUANT: CPT

## 2024-03-12 PROCEDURE — 83036 HEMOGLOBIN GLYCOSYLATED A1C: CPT

## 2024-03-12 PROCEDURE — 99221 1ST HOSP IP/OBS SF/LOW 40: CPT | Mod: 25

## 2024-03-12 PROCEDURE — 84295 ASSAY OF SERUM SODIUM: CPT

## 2024-03-12 PROCEDURE — 74230 X-RAY XM SWLNG FUNCJ C+: CPT

## 2024-03-12 PROCEDURE — 83605 ASSAY OF LACTIC ACID: CPT

## 2024-03-12 PROCEDURE — 93306 TTE W/DOPPLER COMPLETE: CPT | Mod: 26

## 2024-03-12 PROCEDURE — 87637 SARSCOV2&INF A&B&RSV AMP PRB: CPT

## 2024-03-12 PROCEDURE — 80061 LIPID PANEL: CPT

## 2024-03-12 PROCEDURE — 85018 HEMOGLOBIN: CPT

## 2024-03-12 PROCEDURE — 99223 1ST HOSP IP/OBS HIGH 75: CPT

## 2024-03-12 PROCEDURE — 93356 MYOCRD STRAIN IMG SPCKL TRCK: CPT

## 2024-03-12 PROCEDURE — 31575 DIAGNOSTIC LARYNGOSCOPY: CPT

## 2024-03-12 PROCEDURE — 92610 EVALUATE SWALLOWING FUNCTION: CPT

## 2024-03-12 PROCEDURE — 99285 EMERGENCY DEPT VISIT HI MDM: CPT

## 2024-03-12 PROCEDURE — 92523 SPEECH SOUND LANG COMPREHEN: CPT

## 2024-03-12 PROCEDURE — 86140 C-REACTIVE PROTEIN: CPT

## 2024-03-12 PROCEDURE — 82435 ASSAY OF BLOOD CHLORIDE: CPT

## 2024-03-12 PROCEDURE — 85027 COMPLETE CBC AUTOMATED: CPT

## 2024-03-12 PROCEDURE — 85610 PROTHROMBIN TIME: CPT

## 2024-03-12 PROCEDURE — 92611 MOTION FLUOROSCOPY/SWALLOW: CPT

## 2024-03-12 PROCEDURE — 70496 CT ANGIOGRAPHY HEAD: CPT | Mod: MC

## 2024-03-12 PROCEDURE — 85014 HEMATOCRIT: CPT

## 2024-03-12 PROCEDURE — 84132 ASSAY OF SERUM POTASSIUM: CPT

## 2024-03-12 PROCEDURE — 0042T: CPT | Mod: MC

## 2024-03-12 PROCEDURE — 97161 PT EVAL LOW COMPLEX 20 MIN: CPT

## 2024-03-12 PROCEDURE — 85730 THROMBOPLASTIN TIME PARTIAL: CPT

## 2024-03-12 PROCEDURE — 82962 GLUCOSE BLOOD TEST: CPT

## 2024-03-12 PROCEDURE — A9585: CPT

## 2024-03-12 PROCEDURE — 82330 ASSAY OF CALCIUM: CPT

## 2024-03-12 PROCEDURE — 70545 MR ANGIOGRAPHY HEAD W/DYE: CPT | Mod: MC

## 2024-03-12 PROCEDURE — 94640 AIRWAY INHALATION TREATMENT: CPT

## 2024-03-12 PROCEDURE — 70551 MRI BRAIN STEM W/O DYE: CPT | Mod: 26

## 2024-03-12 PROCEDURE — 36415 COLL VENOUS BLD VENIPUNCTURE: CPT

## 2024-03-12 PROCEDURE — 80053 COMPREHEN METABOLIC PANEL: CPT

## 2024-03-12 PROCEDURE — 97165 OT EVAL LOW COMPLEX 30 MIN: CPT

## 2024-03-12 PROCEDURE — 70498 CT ANGIOGRAPHY NECK: CPT | Mod: MC

## 2024-03-12 PROCEDURE — 85025 COMPLETE CBC W/AUTO DIFF WBC: CPT

## 2024-03-12 RX ORDER — ATORVASTATIN CALCIUM 80 MG/1
1 TABLET, FILM COATED ORAL
Qty: 30 | Refills: 1
Start: 2024-03-12 | End: 2024-05-10

## 2024-03-12 RX ORDER — SARILUMAB 150 MG/1.14ML
200 INJECTION, SOLUTION SUBCUTANEOUS
Refills: 0 | DISCHARGE

## 2024-03-12 RX ORDER — AMLODIPINE BESYLATE 2.5 MG/1
10 TABLET ORAL DAILY
Refills: 0 | Status: DISCONTINUED | OUTPATIENT
Start: 2024-03-12 | End: 2024-03-12

## 2024-03-12 RX ORDER — ASPIRIN/CALCIUM CARB/MAGNESIUM 324 MG
1 TABLET ORAL
Qty: 0 | Refills: 0 | DISCHARGE
Start: 2024-03-12

## 2024-03-12 RX ORDER — ATORVASTATIN CALCIUM 80 MG/1
80 TABLET, FILM COATED ORAL AT BEDTIME
Refills: 0 | Status: DISCONTINUED | OUTPATIENT
Start: 2024-03-12 | End: 2024-03-12

## 2024-03-12 RX ORDER — CLOPIDOGREL BISULFATE 75 MG/1
1 TABLET, FILM COATED ORAL
Qty: 0 | Refills: 0 | DISCHARGE
Start: 2024-03-12

## 2024-03-12 RX ORDER — CLOPIDOGREL BISULFATE 75 MG/1
75 TABLET, FILM COATED ORAL DAILY
Refills: 0 | Status: DISCONTINUED | OUTPATIENT
Start: 2024-03-12 | End: 2024-03-12

## 2024-03-12 RX ORDER — PANTOPRAZOLE SODIUM 20 MG/1
40 TABLET, DELAYED RELEASE ORAL
Refills: 0 | Status: DISCONTINUED | OUTPATIENT
Start: 2024-03-12 | End: 2024-03-12

## 2024-03-12 RX ORDER — PANTOPRAZOLE SODIUM 20 MG/1
1 TABLET, DELAYED RELEASE ORAL
Qty: 0 | Refills: 0 | DISCHARGE
Start: 2024-03-12

## 2024-03-12 RX ORDER — LEFLUNOMIDE 10 MG/1
1 TABLET ORAL
Refills: 0 | DISCHARGE

## 2024-03-12 RX ORDER — PANTOPRAZOLE SODIUM 20 MG/1
1 TABLET, DELAYED RELEASE ORAL
Qty: 30 | Refills: 1
Start: 2024-03-12 | End: 2024-05-10

## 2024-03-12 RX ORDER — ASPIRIN/CALCIUM CARB/MAGNESIUM 324 MG
81 TABLET ORAL DAILY
Refills: 0 | Status: DISCONTINUED | OUTPATIENT
Start: 2024-03-12 | End: 2024-03-12

## 2024-03-12 RX ORDER — ASPIRIN/CALCIUM CARB/MAGNESIUM 324 MG
1 TABLET ORAL
Qty: 30 | Refills: 2
Start: 2024-03-12 | End: 2024-06-09

## 2024-03-12 RX ADMIN — AMLODIPINE BESYLATE 10 MILLIGRAM(S): 2.5 TABLET ORAL at 16:37

## 2024-03-12 RX ADMIN — Medication 1 MILLIGRAM(S): at 06:40

## 2024-03-12 RX ADMIN — ENOXAPARIN SODIUM 40 MILLIGRAM(S): 100 INJECTION SUBCUTANEOUS at 16:37

## 2024-03-12 RX ADMIN — Medication 81 MILLIGRAM(S): at 16:37

## 2024-03-12 NOTE — CONSULT NOTE ADULT - SUBJECTIVE AND OBJECTIVE BOX
CC: Hoarseness    HPI: 58-year-old right-handed female w/ PMHx HTN (uncontrolled, non-adherent to amlodipine), asthma, RA, gastric ulcers, fibromyalgia, distant h/o migraines, no h/o stroke, non-smoker, not on antithrombotics, who presents to [facility] ED due to headache followed by left body numbness and weakness. Patient reports that she woke up this morning at 04:00 AM 3/11/24 with a severe headache affecting her right forehead, difficult for her to characterize, not associated with photophobia, nausea, or vomiting. She remained in bed unable to fall back to sleep. At about 07:00 AM, she felt an "electric" sensation throughout her left body. She has had this "electric" type of sensation in the past, which she attributes to fibromyalgia, however, it is usually in "patches," rather than affecting her entire unilateral body. This electric sensation was followed by her noticing that her left face (pointing to V1-V3) felt numb and maybe a little "tight." She subsequently had left upper extremity numbness, followed by left lower extremity numbness. ENT was consulted for hoarseness and dysphagia. Hoarseness started yesterday, but improved today. Pt is S/P FEES today and was placed on a regular diet. Pt denies sore throat, cough, SOB.         PAST MEDICAL & SURGICAL HISTORY:  Seasonal allergies      Rheumatoid arthritis      H/O fibromyalgia      Asthma      Hypertension      History of         H/O bilateral breast reduction surgery          Allergies    No Known Allergies    Intolerances      MEDICATIONS  (STANDING):  amLODIPine   Tablet 10 milliGRAM(s) Oral daily  aspirin  chewable 81 milliGRAM(s) Oral daily  atorvastatin 80 milliGRAM(s) Oral at bedtime  enoxaparin Injectable 40 milliGRAM(s) SubCutaneous every 24 hours  pantoprazole    Tablet 40 milliGRAM(s) Oral before breakfast  sodium chloride 0.9%. 1000 milliLiter(s) (75 mL/Hr) IV Continuous <Continuous>    MEDICATIONS  (PRN):  fluticasone propionate 50 MICROgram(s)/spray Nasal Spray 1 Spray(s) Both Nostrils two times a day PRN nasal congestion      Social History: no tobacco use    Family history: no pertinent FHx    ROS:   ENT: all negative except as noted in HPI   CV: denies palpitations  Pulm: denies SOB, cough, hemoptysis  GI: denies change in apetite, indigestion, n/v  : denies pertinent urinary symptoms, urgency  Neuro: denies numbness/tingling, loss of sensation  Psych: denies anxiety  MS: denies muscle weakness, instability  Heme: denies easy bruising or bleeding  Endo: denies heat/cold intolerance, excessive sweating  Vascular: denies LE edema    Vital Signs Last 24 Hrs  T(C): 37.1 (12 Mar 2024 11:41), Max: 37.1 (12 Mar 2024 08:38)  T(F): 98.7 (12 Mar 2024 11:41), Max: 98.7 (12 Mar 2024 08:38)  HR: 80 (12 Mar 2024 15:10) (60 - 80)  BP: 146/85 (12 Mar 2024 15:10) (120/79 - 150/81)  BP(mean): --  RR: 18 (12 Mar 2024 11:41) (18 - 19)  SpO2: 97% (12 Mar 2024 15:10) (96% - 99%)    Parameters below as of 12 Mar 2024 15:10  Patient On (Oxygen Delivery Method): room air                              13.5   5.24  )-----------( 219      ( 12 Mar 2024 07:53 )             40.6    03-12    140  |  105  |  10  ----------------------------<  88  4.0   |  23  |  0.80    Ca    9.7      12 Mar 2024 06:41    TPro  7.5  /  Alb  4.8  /  TBili  0.3  /  DBili  x   /  AST  21  /  ALT  23  /  AlkPhos  72  03-11   PT/INR - ( 11 Mar 2024 10:22 )   PT: 10.4 sec;   INR: 0.94 ratio         PTT - ( 11 Mar 2024 10:22 )  PTT:25.5 sec    PHYSICAL EXAM:  Gen: NAD  Skin: No rashes, bruises, or lesions  Head: Normocephalic, Atraumatic  Face: no edema, erythema, or fluctuance. Parotid glands soft without mass  Eyes: no scleral injection  Nose: Nares bilaterally patent, no discharge  Mouth: No Stridor / Drooling / Trismus.  Mucosa moist, tongue/uvula midline, oropharynx clear  Neck: Flat, supple, no lymphadenopathy, trachea midline, no masses  Lymphatic: No lymphadenopathy  Resp: breathing easily, no stridor  CV: no peripheral edema/cyanosis  GI: nondistended   Peripheral vascular: no JVD or edema  Neuro: facial nerve intact, no facial droop      Fiberoptic Indirect laryngoscopy:  (Scope #2 used)    Reason for Laryngoscopy:    Patient was unable to cooperate with mirror.  Pachydermia consistent with LPR. Nasopharynx, oropharynx, and hypopharynx clear, no bleeding. Tongue base, posterior pharyngeal wall, vallecula, epiglottis, and subglottis appear normal. No erythema, edema, pooling of secretions, masses or lesions. Airway patent, no foreign body visualized. No glottic/supraglottic edema. True vocal cords, arytenoids, vestibular folds, ventricles, pyriform sinuses, and aryepiglottic folds appear normal bilaterally. Vocal cords mobile with good contact b/l.

## 2024-03-12 NOTE — PHYSICAL THERAPY INITIAL EVALUATION ADULT - WEIGHT-BEARING RESTRICTIONS: GAIT, REHAB EVAL
I have personally seen and examined the patient. I have collaborated with and supervised the
full weight-bearing

## 2024-03-12 NOTE — SWALLOW VFSS/MBS ASSESSMENT ADULT - DIAGNOSTIC IMPRESSIONS
57 yo female admitted with severe, unilateral HA, followed by left sensorimotor symptoms, rapidly migrating from face to leg, w/ marked subjective improvement. Head imaging negative for acute infarct. Pt presents with a mild oropharyngeal dysphagia marked by reduced bolus formation/control on thin liquid, premature spillover of thin fluid into an open airway prior to the swallow and silent laryngeal penetration to the level cords. No aspiration noted. Compensatory strategies employed. A chin tuck position improves airway protection. There is slow transit of material through the proximal esophagus with initial retention and delayed transit noted on easy to chew. Pt continues to endorse c/o "food getting stuck" and may benefit from further imaging of the esophageal stage of swallow.     Disorders: reduced lingual strength/ROM/Rate of motion, reduced BOT to posterior pharyngeal wall contact, delay in trigger of the swallow reflex,  reduced supraglottic sensation

## 2024-03-12 NOTE — SWALLOW BEDSIDE ASSESSMENT ADULT - PHARYNGEAL PHASE
multiple swallows x6/Decreased laryngeal elevation/Cough post oral intake/Throat clear post oral intake/Complaints of pharyngeal stasis/Multiple swallows

## 2024-03-12 NOTE — SWALLOW VFSS/MBS ASSESSMENT ADULT - ADDITIONAL RECOMMENDATIONS
patient will carryover compensatory strategies with meals. pt will tolerate  recommended diet with no s/s of aspiration.

## 2024-03-12 NOTE — PHYSICAL THERAPY INITIAL EVALUATION ADULT - ACTIVE RANGE OF MOTION EXAMINATION, REHAB EVAL
melinda. upper extremity Active ROM was WNL (within normal limits)/bilateral lower extremity Active ROM was WNL (within normal limits)

## 2024-03-12 NOTE — DISCHARGE NOTE PROVIDER - CARE PROVIDERS DIRECT ADDRESSES
[email or web address]

## 2024-03-12 NOTE — DISCHARGE NOTE PROVIDER - PROVIDER TOKENS
PROVIDER:[TOKEN:[[id number]],FOLLOWUP:[2 weeks]] PROVIDER:[TOKEN:[[id number]],FOLLOWUP:[2 weeks]]

## 2024-03-12 NOTE — SWALLOW BEDSIDE ASSESSMENT ADULT - SLP GENERAL OBSERVATIONS
Pt found sitting at edge of bed, alert and communicative. +Dysphonia-hoarse vocal quality, reduced volume. Pleasant and cooperative, patient on room air. Pt endorses +difficulty swallowing "pills get stuck" "food gets stuck" and reports progressive difficulty in swallowing.

## 2024-03-12 NOTE — CONSULT NOTE ADULT - ASSESSMENT
58-year-old right-handed female w/ PMHx HTN (uncontrolled, non-adherent to amlodipine), asthma, RA, gastric ulcers, fibromyalgia, distant h/o migraines, no h/o stroke, non-smoker, not on antithrombotics, who presents to [facility] ED due to headache followed by left body numbness and weakness. Patient reports that she woke up this morning at 04:00 AM 3/11/24 with a severe headache affecting her right forehead, difficult for her to characterize, not associated with photophobia, nausea, or vomiting. She remained in bed unable to fall back to sleep. At about 07:00 AM, she felt an "electric" sensation throughout her left body. She has had this "electric" type of sensation in the past, which she attributes to fibromyalgia, however, it is usually in "patches," rather than affecting her entire unilateral body. This electric sensation was followed by her noticing that her left face (pointing to V1-V3) felt numb and maybe a little "tight." She subsequently had left upper extremity numbness, followed by left lower extremity numbness. ENT was consulted for hoarseness and dysphagia. Hoarseness started yesterday, but improved today. Pt is S/P FEES today and was placed on a regular diet. Bedside flexible laryngoscopy revealed LPR. B/L VC mobile and intact with good contact.
INCOMPLETE    Assessment: ***. Initial VS in ED: ***. Exam: ***.      mRS: 0  LKN: 07:00 AM 3/11/24  NIHSS: 2 (LLE drift, mild-moderate sensory loss)    Not a tenecteplase candidate due to isolated neurological deficits and non-disabling deficits.  Not a mechanical thrombectomy candidate due to no LVO.    Impression: ***    Recommendations:    Disposition: Admit to stroke floor (not unit) for q4h - patient failed dysphagia, acute ischemic stroke on the differential.    Diagnostics:  [] MRI brain without contrast   [] TTE w/ bubble   [] Implantable loop recorder  [] Lipid panel, HbA1c  [] CBC, CMP, coagulation panel, troponin    Medications:  [] ASA 81 mg PO (325 per rectum if fails dysphagia) -- starting 3/12/24 (patient took  mg x1 prior to arrival)  [] Clopidogrel 75 mg QD x 21 days (final plan to be determined after MRI brain)  [] Atorvastatin 80mg (titrate to LDL < 70)   [] Lovenox SQ for DVT prophylaxis     Miscellaneous:  [] Bilateral sequential compression device  [] NPO unless passes dysphagia screen - failed in ED; therefore, swallow evaluation  [] Keep BP permissive up to 220/110 for 48 hours followed by gradual normotension over 2-3 days   [] Telemonitoring  [] Neurological checks and vital signs per unit protocol  [] BGM goals 140-180  [] PT/OT; S/S evaluation    * Case and plan not final until Attending attestation *

## 2024-03-12 NOTE — SWALLOW BEDSIDE ASSESSMENT ADULT - H & P REVIEW
58 RHF w/ PMHx HTN (uncontrolled, non-adherent to amlodipine), distant h/o migraine (last migraine > 20 years ago), anxiety/depression, gastric ulcers, fibromyalgia, RA, non-smoker, no h/o stroke, not on antithrombotics, presenting d/t HA followed by left body sensorimotor symptoms. Patient woke up 04:00 AM 3/11 w/ severe L frontal headache, followed by L face numbness, then LUE numbness, then LLE numbness, subsequently finding that she had LLE weakness upon ambulating at home. Unclear timing regarding spread of numbness from face to leg, possibly occurring over the course of 1 minute. She took  mg PO at home x 1. On arrival to ED, patient reporting at least 50% improvement of above symptoms. However, she failed dysphagia in ED, also reporting some difficulty swallowing (pointing to left throat) her home medications prior to arrival. Initial VS in ED: /108, HR 78, afebrile.

## 2024-03-12 NOTE — DISCHARGE NOTE NURSING/CASE MANAGEMENT/SOCIAL WORK - NSDCPEFALRISK_GEN_ALL_CORE
For information on Fall & Injury Prevention, visit: [email or web address] OR  [email or web address] OR  https://www.cdc.gov/steadi/patient.html

## 2024-03-12 NOTE — DISCHARGE NOTE PROVIDER - NSDCMRMEDTOKEN_GEN_ALL_CORE_FT
ALPRAZolam 0.5 mg oral tablet: 2 tab(s) orally once a day as needed for  anxiety  amLODIPine 10 mg oral tablet: 1 tab(s) orally once a day  aspirin 81 mg oral tablet, chewable: 1 tab(s) orally once a day  carisoprodol 350 mg oral tablet: 1 tab(s) orally once a day (at bedtime)  desvenlafaxine (as succinate) 50 mg oral tablet, extended release: 1 tab(s) orally 2 times a day  pantoprazole 40 mg oral delayed release tablet: 1 tab(s) orally once a day (before a meal)  Symbicort 160 mcg-4.5 mcg/inh inhalation aerosol: 2 puff(s) inhaled 2 times a day   ALPRAZolam 0.5 mg oral tablet: 2 tab(s) orally once a day as needed for  anxiety  amLODIPine 10 mg oral tablet: 1 tab(s) orally once a day  aspirin 81 mg oral tablet, chewable: 1 tab(s) orally once a day  carisoprodol 350 mg oral tablet: 1 tab(s) orally once a day (at bedtime)  clopidogrel 75 mg oral tablet: 1 tab(s) orally once a day  desvenlafaxine (as succinate) 50 mg oral tablet, extended release: 1 tab(s) orally 2 times a day  pantoprazole 40 mg oral delayed release tablet: 1 tab(s) orally once a day (before a meal)  Symbicort 160 mcg-4.5 mcg/inh inhalation aerosol: 2 puff(s) inhaled 2 times a day   ALPRAZolam 0.5 mg oral tablet: 2 tab(s) orally once a day as needed for  anxiety  amLODIPine 10 mg oral tablet: 1 tab(s) orally once a day  aspirin 81 mg oral tablet, chewable: 1 tab(s) orally once a day MDD: 81mg  atorvastatin 80 mg oral tablet: 1 tab(s) orally once a day (at bedtime) MDD: 80mg  carisoprodol 350 mg oral tablet: 1 tab(s) orally once a day (at bedtime)  desvenlafaxine (as succinate) 50 mg oral tablet, extended release: 1 tab(s) orally 2 times a day  pantoprazole 40 mg oral delayed release tablet: 1 tab(s) orally once a day (before a meal) MDD: 40mg  Symbicort 160 mcg-4.5 mcg/inh inhalation aerosol: 2 puff(s) inhaled 2 times a day

## 2024-03-12 NOTE — DISCHARGE NOTE NURSING/CASE MANAGEMENT/SOCIAL WORK - PATIENT PORTAL LINK FT
You can access the FollowMyHealth Patient Portal offered by [facility] by registering at the following website: [email or web address]. By joining [employer]’s FollowMyHealth portal, you will also be able to view your health information using other applications (apps) compatible with our system.

## 2024-03-12 NOTE — DISCHARGE NOTE PROVIDER - CARE PROVIDER_API CALL
Elizabeth Carrillo  NP in Family Health  [address], [address]  [place] [address]  Phone: [phone]  Fax: [phone]  Follow Up Time: 2 weeks   Alley Lambert  Neurology  [address], [address]  [place] [address]  Phone: [phone]  Fax: [phone]  Follow Up Time: 2 weeks

## 2024-03-12 NOTE — SWALLOW BEDSIDE ASSESSMENT ADULT - SWALLOW EVAL: DIAGNOSIS
Pt presents with evidence of 1) a pharyngo-esophageal dysphagia marked by multiple swallows, c/o pharyngeal stasis and changes in vocal quality indicative of possible laryngeal penetration/aspiration. Objective swallow study warranted to further assess. 2) Dysphonia characterized by hoarse vocal quality, reduced volume. Pt endorses +hx of dysphonia and GERD, however, reports significant change in voice since admission to hospital.

## 2024-03-12 NOTE — SWALLOW VFSS/MBS ASSESSMENT ADULT - ORAL PHASE
within functional limits mil in amount, deep to the level of the vocal cords/Incomplete tongue to palate contact/Uncontrolled bolus / spillover in fabricio-pharynx/Uncontrolled bolus / spillover in hypopharynx/Laryngeal penetration before swallow - silent

## 2024-03-12 NOTE — SPEECH LANGUAGE PATHOLOGY EVALUATION - COMMENTS
Failed dysphagia screen on 5mls-npo pending bedside swallow evaluation.   mRS: 0   LKN: 07:00 AM 3/11/24   NIHSS: 2 (LLE drift, mild-moderate sensory loss)  Not a tenecteplase candidate due to isolated neurological deficits and non-disabling deficits. Not a mechanical thrombectomy candidate due to no LVO. Impression: Severe, unilateral HA, followed by left sensorimotor symptoms, rapidly migrating from face to leg, w/ marked subjective improvement. Symptoms localize to right brain dysfunction, possibly central sulcus or thalamocapsular region. Differential diagnosis include migraine with aura vs acute ischemic stroke.  Admit to stroke floor (not unit) for q4h - patient failed dysphagia, acute ischemic stroke on the differential.  3/11-> provider contact note-> Pt c/o of increased facial numbness and new tingling. Pt has absent sensation on face. Aspirin suppository ordered. not tested at this time +hx of GERD per patient, patient endorses baseline "raspy voice" however significantly worsened since admission

## 2024-03-12 NOTE — PHYSICAL THERAPY INITIAL EVALUATION ADULT - PERTINENT HX OF CURRENT PROBLEM, REHAB EVAL
58 RHF w/ PMHx HTN (uncontrolled, non-adherent to amlodipine), distant h/o migraine (last migraine > 20 years ago), anxiety/depression, gastric ulcers, fibromyalgia, RA, non-smoker, no h/o stroke, not on antithrombotics, presenting with c/o headache followed by left body sensorimotor symptoms. Patient woke up 04:00 AM 3/11 w/ severe L frontal headache, followed by L face numbness, then LUE numbness, then LLE numbness, subsequently finding that she had LLE weakness upon ambulating at home. Unclear timing regarding spread of numbness from face to leg, possibly occurring over the course of 1 minute. Pt took  mg PO at home x 1. On arrival to ED, patient reporting at least 50% improvement of above symptoms. However, she failed dysphagia in ED, also reporting some difficulty swallowing (pointing to left throat) her home medications prior to arrival. Initial VS in ED: /108, HR 78, afebrile.   mRS: 0  LKN: 07:00 AM 3/11/24  NIHSS: 2 (LLE drift, mild-moderate sensory loss)  Not a tenecteplase candidate due to isolated neurological deficits and non-disabling deficits.  Not a mechanical thrombectomy candidate due to no LVO.  Per Neuro,sSevere, unilateral HA, followed by left sensorimotor symptoms, rapidly migrating from face to leg, w/ marked subjective improvement. Symptoms localize to right brain dysfunction, possibly central sulcus or thalamocapsular region. Differential diagnosis include migraine with aura vs acute ischemic stroke.   3/11/24 CTH: Mild small vessel white matter ischemic changes without significant change from 6/7/2019. Normal CT perfusion. Normal CTA of the head and neck. 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  3/12/2024 MRI HEAD: No acute intracranial hemorrhage or evidence of acute ischemia. Multiple similar-appearing nonspecific abnormal white matter foci of T2/FLAIR prolongation statistically favoring microvascular type changes. 58 RHF w/ PMHx HTN (uncontrolled, non-adherent to amlodipine), distant h/o migraine (last migraine > 20 years ago), anxiety/depression, gastric ulcers, fibromyalgia, RA, non-smoker, no h/o stroke, not on antithrombotics, presenting with c/o headache followed by left body sensorimotor symptoms. Patient woke up 04:00 AM 3/11 w/ severe L frontal headache, followed by L face numbness, then LUE numbness, then LLE numbness, subsequently finding that she had LLE weakness upon ambulating at home. Unclear timing regarding spread of numbness from face to leg, possibly occurring over the course of 1 minute. Pt took  mg PO at home x 1. On arrival to ED, patient reporting at least 50% improvement of above symptoms. However, she failed dysphagia in ED, also reporting some difficulty swallowing (pointing to left throat) her home medications prior to arrival. Initial VS in ED: /108, HR 78, afebrile.   mRS: 0  LKN: 07:00 AM 3/11/24  NIHSS: 2 (LLE drift, mild-moderate sensory loss)  Not a tenecteplase candidate due to isolated neurological deficits and non-disabling deficits.  Not a mechanical thrombectomy candidate due to no LVO.  Per Neuro, Severe, unilateral HA, followed by left sensorimotor symptoms, rapidly migrating from face to leg, w/ marked subjective improvement. Symptoms localize to right brain dysfunction, possibly central sulcus or thalamocapsular region. Differential diagnosis include migraine with aura vs acute ischemic stroke.   3/11/24 CTH: Mild small vessel white matter ischemic changes without significant change from 6/7/2019. Normal CT perfusion. Normal CTA of the head and neck. 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  3/12/2024 MRI HEAD: No acute intracranial hemorrhage or evidence of acute ischemia. Multiple similar-appearing nonspecific abnormal white matter foci of T2/FLAIR prolongation statistically favoring microvascular type changes.

## 2024-03-12 NOTE — PHYSICAL THERAPY INITIAL EVALUATION ADULT - WEIGHT-BEARING RESTRICTIONS: SIT/STAND, REHAB EVAL
Spoke with the patient. Verified patient with two patient identifiers. Results given and questions answered. Patient verbalized understanding. full weight-bearing

## 2024-03-12 NOTE — DISCHARGE NOTE PROVIDER - HOSPITAL COURSE
58-year-old right-handed female w/ PMHx HTN (uncontrolled, non-adherent to amlodipine), asthma, RA, gastric ulcers, fibromyalgia, distant h/o migraines, no h/o stroke, non-smoker, not on antithrombotics, who presents to [facility] ED due to headache followed by left body numbness and weakness. Patient reports that she woke up this morning at 04:00 AM 3/11/24 with a severe headache affecting her right forehead, difficult for her to characterize, not associated with photophobia, nausea, or vomiting. She remained in bed unable to fall back to sleep. At about 07:00 AM, she felt an "electric" sensation throughout her left body. She has had this "electric" type of sensation in the past, which she attributes to fibromyalgia, however, it is usually in "patches," rather than affecting her entire unilateral body. This electric sensation was followed by her noticing that her left face (pointing to V1-V3) felt numb and maybe a little "tight." She subsequently had left upper extremity numbness, followed by left lower extremity numbness. It is difficult for her to report how long it took for this sensation to migrate from face to lower extremity, but seemingly less than one minute. She eventually stood up to get out of bed, which is when she noticed that she had weakness in her LLE associated with difficulty ambulating. She also endorsed that she had some LUE weakness at this time as well. Patient took four aspirin 81 mg tablets (aspirin 325 mg x 1) prior to coming to the ED. She reports that she noticed some difficulty swallowing her AM medications, including the aspirin. She has never had the symptoms above in the past.     Code stroke called 10:17 AM 3/11/24 upon arrival to the ED. At that time, patient reporting at least 50% improvement of her symptoms. On arrival, her headache is only "slight." She denies changes in speech, vision, hearing, voice quality, or balance/room-spinning sensations.     Regarding migraine history, she reports having "very bad" migraine headaches in the past, but she has not had a migraine headache since she was 36 years old. 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 (Again, history is unclear.) She states that she was apparently worked up for multiple sclerosis about 10 years ago by a neurologist due to an unusual finding by an optometrist or ophthalmologist. She denies having had MRIs at that time as part of her MS workup. Patient had a lumbar puncture at that time, which was reportedly unrevealing. She denies history of vision loss in the past. She also denies past symptoms of focal neurological deficits, like numbness or weakness. She also reports history of cervical and lumbosacral pathology, described as herniations.     Patient admits to non-adherence to her amlodipine. Per patient and her partner at bedside, patient's blood pressure is often elevated at home. SBP > 160 and DBP > 100 is not unusual. She has been told many times in the past to adhere to her BP medication. She denies any adverse effect from amlodipine, only stating that she does not always like to take her medication.    Impression: Severe, unilateral HA, followed by left sensorimotor symptoms, rapidly migrating from face to leg, w/ marked subjective improvement likely due to TIA as MRI negative for acute infarct.     Patient was admitted for dysphagia due to failing dysphagia screen. Patient was seen by speech therapy and underwent MBS and was recommended for regular diet. Patient is medically stable for discharge to home.     IMAGING:    CT Brain Stroke Protocol (03.11.24  Mild small vessel white matter ischemic changes without   significant change from 6/7/2019. Normal CT perfusion. Normal CTA of the   head and neck. 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    MR Head No Cont (03.12.24   No acute intracranial hemorrhage or evidence of acute   ischemia.  Multiple similar-appearing nonspecific abnormal white matter foci of   T2/FLAIR prolongation statistically favoring microvascular type changes. 58-year-old right-handed female w/ PMHx HTN (uncontrolled, non-adherent to amlodipine), asthma, RA, gastric ulcers, fibromyalgia, distant h/o migraines, no h/o stroke, non-smoker, not on antithrombotics, who presents to [facility] ED due to headache followed by left body numbness and weakness. Patient reports that she woke up this morning at 04:00 AM 3/11/24 with a severe headache affecting her right forehead, difficult for her to characterize, not associated with photophobia, nausea, or vomiting. She remained in bed unable to fall back to sleep. At about 07:00 AM, she felt an "electric" sensation throughout her left body. She has had this "electric" type of sensation in the past, which she attributes to fibromyalgia, however, it is usually in "patches," rather than affecting her entire unilateral body. This electric sensation was followed by her noticing that her left face (pointing to V1-V3) felt numb and maybe a little "tight." She subsequently had left upper extremity numbness, followed by left lower extremity numbness. It is difficult for her to report how long it took for this sensation to migrate from face to lower extremity, but seemingly less than one minute. She eventually stood up to get out of bed, which is when she noticed that she had weakness in her LLE associated with difficulty ambulating. She also endorsed that she had some LUE weakness at this time as well. Patient took four aspirin 81 mg tablets (aspirin 325 mg x 1) prior to coming to the ED. She reports that she noticed some difficulty swallowing her AM medications, including the aspirin. She has never had the symptoms above in the past.     Code stroke called 10:17 AM 3/11/24 upon arrival to the ED. At that time, patient reporting at least 50% improvement of her symptoms. On arrival, her headache is only "slight." She denies changes in speech, vision, hearing, voice quality, or balance/room-spinning sensations.     Regarding migraine history, she reports having "very bad" migraine headaches in the past, but she has not had a migraine headache since she was 36 years old. 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 (Again, history is unclear.) She states that she was apparently worked up for multiple sclerosis about 10 years ago by a neurologist due to an unusual finding by an optometrist or ophthalmologist. She denies having had MRIs at that time as part of her MS workup. Patient had a lumbar puncture at that time, which was reportedly unrevealing. She denies history of vision loss in the past. She also denies past symptoms of focal neurological deficits, like numbness or weakness. She also reports history of cervical and lumbosacral pathology, described as herniations.     Patient admits to non-adherence to her amlodipine. Per patient and her partner at bedside, patient's blood pressure is often elevated at home. SBP > 160 and DBP > 100 is not unusual. She has been told many times in the past to adhere to her BP medication. She denies any adverse effect from amlodipine, only stating that she does not always like to take her medication.    Impression: Severe, unilateral HA, followed by left sensorimotor symptoms, rapidly migrating from face to leg, w/ marked subjective improvement likely due to TIA as MRI negative for acute infarct.     Patient was admitted for dysphagia due to failing dysphagia screen. Patient was seen by speech therapy and underwent MBS and was recommended for regular diet. Patient is medically stable for discharge to home. Patient should follow up with ENT, GI, and neurology.     IMAGING:    CT Brain Stroke Protocol (03.11.24  Mild small vessel white matter ischemic changes without   significant change from 6/7/2019. Normal CT perfusion. Normal CTA of the   head and neck. 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    MR Head No Cont (03.12.24   No acute intracranial hemorrhage or evidence of acute   ischemia.  Multiple similar-appearing nonspecific abnormal white matter foci of   T2/FLAIR prolongation statistically favoring microvascular type changes. 58-year-old right-handed female w/ PMHx HTN (uncontrolled, non-adherent to amlodipine), asthma, RA, gastric ulcers, fibromyalgia, distant h/o migraines, no h/o stroke, non-smoker, not on antithrombotics, who presents to [facility] ED due to headache followed by left body numbness and weakness. Patient reports that she woke up this morning at 04:00 AM 3/11/24 with a severe headache affecting her right forehead, difficult for her to characterize, not associated with photophobia, nausea, or vomiting. She remained in bed unable to fall back to sleep. At about 07:00 AM, she felt an "electric" sensation throughout her left body. She has had this "electric" type of sensation in the past, which she attributes to fibromyalgia, however, it is usually in "patches," rather than affecting her entire unilateral body. This electric sensation was followed by her noticing that her left face (pointing to V1-V3) felt numb and maybe a little "tight." She subsequently had left upper extremity numbness, followed by left lower extremity numbness. It is difficult for her to report how long it took for this sensation to migrate from face to lower extremity, but seemingly less than one minute. She eventually stood up to get out of bed, which is when she noticed that she had weakness in her LLE associated with difficulty ambulating. She also endorsed that she had some LUE weakness at this time as well. Patient took four aspirin 81 mg tablets (aspirin 325 mg x 1) prior to coming to the ED. She reports that she noticed some difficulty swallowing her AM medications, including the aspirin. She has never had the symptoms above in the past.     Code stroke called 10:17 AM 3/11/24 upon arrival to the ED. At that time, patient reporting at least 50% improvement of her symptoms. On arrival, her headache is only "slight." She denies changes in speech, vision, hearing, voice quality, or balance/room-spinning sensations.     Regarding migraine history, she reports having "very bad" migraine headaches in the past, but she has not had a migraine headache since she was 36 years old. 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 (Again, history is unclear.) She states that she was apparently worked up for multiple sclerosis about 10 years ago by a neurologist due to an unusual finding by an optometrist or ophthalmologist. She denies having had MRIs at that time as part of her MS workup. Patient had a lumbar puncture at that time, which was reportedly unrevealing. She denies history of vision loss in the past. She also denies past symptoms of focal neurological deficits, like numbness or weakness. She also reports history of cervical and lumbosacral pathology, described as herniations.     Patient admits to non-adherence to her amlodipine. Per patient and her partner at bedside, patient's blood pressure is often elevated at home. SBP > 160 and DBP > 100 is not unusual. She has been told many times in the past to adhere to her BP medication. She denies any adverse effect from amlodipine, only stating that she does not always like to take her medication.    Impression: Severe, unilateral HA, followed by left sensorimotor symptoms, rapidly migrating from face to leg, w/ marked subjective improvement likely due to TIA as MRI negative for acute infarct.     Patient was admitted for dysphagia due to failing dysphagia screen. Patient was seen by speech therapy and underwent MBS and was recommended for regular diet. Patient is medically stable for discharge to home. Patient should follow up with ENT, GI, and neurology. Recommend to stop taking the hormone therapy and get advice from a physician.     IMAGING:    CT Brain Stroke Protocol (03.11.24  Mild small vessel white matter ischemic changes without   significant change from 6/7/2019. Normal CT perfusion. Normal CTA of the   head and neck. 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    MR Head No Cont (03.12.24   No acute intracranial hemorrhage or evidence of acute   ischemia.  Multiple similar-appearing nonspecific abnormal white matter foci of   T2/FLAIR prolongation statistically favoring microvascular type changes.

## 2024-03-12 NOTE — OCCUPATIONAL THERAPY INITIAL EVALUATION ADULT - LIVES WITH, PROFILE
Pt lives with spouse in PH +2 [address] and 1 flight indoors. Pt reports independence with all aspects of self care and functional mobility without AD PTA.

## 2024-03-12 NOTE — SWALLOW BEDSIDE ASSESSMENT ADULT - ADDITIONAL RECOMMENDATIONS
swallow goals: pt will tolerate recommended diet with no s/s of aspiration. Further goals TBD pending mbs results.

## 2024-03-12 NOTE — SWALLOW VFSS/MBS ASSESSMENT ADULT - ESOPHAGEAL STAGE
initial retention of material noted at C5-C6 (?due to cervical osteophyte) with delayed transit through the proximal esophagus slow transit of material through the proximal esophagus

## 2024-03-12 NOTE — PHYSICAL THERAPY INITIAL EVALUATION ADULT - ADDITIONAL COMMENTS
Pt lives with spouse in private home with 4 stairs to entrance +HR with additional flight of stairs +HR to second level bedroom and bathroom on second level. Prior to admission pt independent with all functional mobility including ambulation without AD.

## 2024-03-12 NOTE — OCCUPATIONAL THERAPY INITIAL EVALUATION ADULT - PERTINENT HX OF CURRENT PROBLEM, REHAB EVAL
58F w/ PMHx HTN (uncontrolled, non-adherent to amlodipine), asthma, RA, gastric ulcers, fibromyalgia, distant h/o migraines, no h/o stroke, non-smoker, not on antithrombotics, who presents to [facility] ED due to headache followed by left body numbness and weakness. Patient reports that she woke up this morning at 04:00 AM 3/11/24 with a severe headache affecting her right forehead, difficult for her to characterize, not associated with photophobia, nausea, or vomiting. She remained in bed unable to fall back to sleep. At about 07:00 AM, she felt an "electric" sensation throughout her left body. She has had this "electric" type of sensation in the past, which she attributes to fibromyalgia, however, it is usually in "patches," rather than affecting her entire unilateral body. This electric sensation was followed by her noticing that her left face (pointing to V1-V3) felt numb and maybe a little "tight." She subsequently had left upper extremity numbness, followed by left lower extremity numbness. It is difficult for her to report how long it took for this sensation to migrate from face to lower extremity, but seemingly less than one minute. She eventually stood up to get out of bed, which is when she noticed that she had weakness in her LLE associated with difficulty ambulating. She also endorsed that she had some LUE weakness at this time as well. Patient took four aspirin 81 mg tablets (aspirin 325 mg x 1) prior to coming to the ED. She reports that she noticed some difficulty swallowing her AM medications, including the aspirin. She has never had the symptoms above in the past. Code stroke called 10:17 AM 3/11/24 upon arrival to the ED. At that time, patient reporting at least 50% improvement of her symptoms. On arrival, her headache is only "slight." She denies changes in speech, vision, hearing, voice quality, or balance/room-spinning sensations. Regarding migraine history, she reports having "very bad" migraine headaches in the past, but she has not had a migraine headache since she was 36 years old. 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 (Again, history is unclear.) She states that she was apparently worked up for multiple sclerosis about 10 years ago by a neurologist due to an unusual finding by an optometrist or ophthalmologist. She denies having had MRIs at that time as part of her MS workup. Patient had a lumbar puncture at that time, which was reportedly unrevealing. She denies history of vision loss in the past. She also denies past symptoms of focal neurological deficits, like numbness or weakness. She also reports history of cervical and lumbosacral pathology, described as herniations. Patient admits to non-adherence to her amlodipine. Per patient and her partner at bedside, patient's blood pressure is often elevated at home. SBP > 160 and DBP > 100 is not unusual. She has been told many times in the past to adhere to her BP medication. She denies any adverse effect from amlodipine, only stating that she does not always like to take her medication.    IMAGING: CTA BRAIN/NECK: Mild small vessel white matter ischemic changes without significant change from 6/7/2019. Normal CT perfusion. Normal CTA of the head and neck. 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 MRI HEAD: No acute intracranial hemorrhage or evidence of acute ischemia. Multiple similar-appearing nonspecific abnormal white matter foci of T2/FLAIR prolongation statistically favoring microvascular type changes.

## 2024-03-12 NOTE — SWALLOW BEDSIDE ASSESSMENT ADULT - COMMENTS
mRS: 0   LKN: 07:00 AM 3/11/24   NIHSS: 2 (LLE drift, mild-moderate sensory loss)  Not a tenecteplase candidate due to isolated neurological deficits and non-disabling deficits. Not a mechanical thrombectomy candidate due to no LVO. Impression: Severe, unilateral HA, followed by left sensorimotor symptoms, rapidly migrating from face to leg, w/ marked subjective improvement. Symptoms localize to right brain dysfunction, possibly central sulcus or thalamocapsular region. Differential diagnosis include migraine with aura vs acute ischemic stroke.  Admit to stroke floor (not unit) for q4h - patient failed dysphagia, acute ischemic stroke on the differential.  3/11-> provider contact note-> Pt c/o of increased facial numbness and new tingling. Pt has absent sensation on face. Aspirin suppository ordered.

## 2024-03-12 NOTE — DISCHARGE NOTE PROVIDER - NSDCCPCAREPLAN_GEN_ALL_CORE_FT
PRINCIPAL DISCHARGE DIAGNOSIS  Diagnosis: Transient ischemic attack  Assessment and Plan of Treatment: You were admitted to the hospital for concern of stroke. Your MRI is negative for acute infarct. Your symptoms were likely due to a transient ischemic attack which is also known as "mini stroke." Please continue taking aspirin 81mg once a day as prescribed. Please take all medications as prescribed. Please follow up with your neurologist and PCP. Return to the ED for any new or worsening stroke like symptoms.      SECONDARY DISCHARGE DIAGNOSES  Diagnosis: Dysphagia  Assessment and Plan of Treatment: You were admitted to the hospital for dysphagia or trouble swallowing. You were seen by speech therapy and underwent a modified barium swallow where your swallowing was assessed with a diagnostic study. You are cleared to continue eating a regular diet with thin liquids. Speech therapy recommends outpatient follow up with ENT as well as a gastroenterologist to continue to manage these symptoms.     PRINCIPAL DISCHARGE DIAGNOSIS  Diagnosis: Transient ischemic attack  Assessment and Plan of Treatment: You were admitted to the hospital for concern of stroke. Your MRI is negative for acute infarct. Your symptoms were likely due to a transient ischemic attack which is also known as "mini stroke." Please continue taking aspirin 81mg once a day as prescribed. Please take all medications as prescribed. Please follow up with your neurologist and PCP. Return to the ED for any new or worsening stroke like symptoms.      SECONDARY DISCHARGE DIAGNOSES  Diagnosis: Dysphagia  Assessment and Plan of Treatment: You were admitted to the hospital for dysphagia or trouble swallowing. You were seen by speech therapy and underwent a modified barium swallow where your swallowing was assessed with a diagnostic study. You are cleared to continue eating a regular diet with thin liquids. Speech therapy recommends outpatient follow up with ENT as well as a gastroenterologist to continue to manage these symptoms.    Diagnosis: Hyperlipidemia  Assessment and Plan of Treatment: Your LDL was 139, you are prescribed atorvastatin 80mg at bedtime for cholesterol management.     PRINCIPAL DISCHARGE DIAGNOSIS  Diagnosis: Transient ischemic attack  Assessment and Plan of Treatment: You were admitted to the hospital for concern of stroke. Your MRI is negative for acute infarct. Your symptoms were likely due to a transient ischemic attack which is also known as "mini stroke." Please continue taking aspirin 81mg once a day as prescribed. We recommend you to stop taking the hormone therapy and get advice from a physician. Please take all medications as prescribed. Please follow up with your neurologist and PCP. Return to the ED for any new or worsening stroke like symptoms.      SECONDARY DISCHARGE DIAGNOSES  Diagnosis: Dysphagia  Assessment and Plan of Treatment: You were admitted to the hospital for dysphagia or trouble swallowing. You were seen by speech therapy and underwent a modified barium swallow where your swallowing was assessed with a diagnostic study. You are cleared to continue eating a regular diet with thin liquids. Speech therapy recommends outpatient follow up with ENT as well as a gastroenterologist to continue to manage these symptoms.    Diagnosis: Hyperlipidemia  Assessment and Plan of Treatment: Your LDL was 139, you are prescribed atorvastatin 80mg at bedtime for cholesterol management.

## 2024-03-12 NOTE — SPEECH LANGUAGE PATHOLOGY EVALUATION - SLP DIAGNOSIS
Pt presents with hypophonia. Vocal quality characterized as hoarse, reduced volume. Pt endorses hx of GERD, no longer on medications and reports increased symptoms symptomatic of GERD. No expressive or receptive language difficulties noted. Speech is clear. Cognitive skills wnl.

## 2024-03-12 NOTE — SWALLOW VFSS/MBS ASSESSMENT ADULT - RECOMMENDED FEEDING/EATING TECHNIQUES
maintain upright posture during/after eating for 30 mins/oral hygiene/position upright (90 degrees)/small sips/bites/tuck chin

## 2024-04-02 ENCOUNTER — APPOINTMENT (OUTPATIENT)
Dept: NEUROSURGERY | Facility: CLINIC | Age: 59
End: 2024-04-02
Payer: COMMERCIAL

## 2024-04-02 VITALS
WEIGHT: 138 LBS | HEIGHT: 62 IN | SYSTOLIC BLOOD PRESSURE: 138 MMHG | OXYGEN SATURATION: 98 % | HEART RATE: 73 BPM | BODY MASS INDEX: 25.4 KG/M2 | DIASTOLIC BLOOD PRESSURE: 81 MMHG

## 2024-04-02 PROCEDURE — 99205 OFFICE O/P NEW HI 60 MIN: CPT

## 2024-04-02 PROCEDURE — 99215 OFFICE O/P EST HI 40 MIN: CPT

## 2024-04-02 RX ORDER — PREGABALIN 75 MG/1
75 CAPSULE ORAL
Qty: 120 | Refills: 0 | Status: DISCONTINUED | COMMUNITY
Start: 2019-05-09 | End: 2024-04-02

## 2024-04-02 RX ORDER — SARILUMAB 150 MG/1.14ML
150 INJECTION, SOLUTION SUBCUTANEOUS
Refills: 0 | Status: ACTIVE | COMMUNITY

## 2024-04-02 RX ORDER — LEFLUNOMIDE 20 MG/1
20 TABLET, FILM COATED ORAL
Qty: 30 | Refills: 0 | Status: DISCONTINUED | COMMUNITY
Start: 2019-01-29 | End: 2024-04-02

## 2024-04-02 RX ORDER — DULOXETINE HYDROCHLORIDE 60 MG/1
60 CAPSULE, DELAYED RELEASE PELLETS ORAL
Qty: 60 | Refills: 0 | Status: DISCONTINUED | COMMUNITY
Start: 2019-01-29 | End: 2024-04-02

## 2024-04-04 NOTE — END OF VISIT
[FreeTextEntry3] :  I have seen and evaluated patient with NP Jacquie Harmon who has completed the documentation above.    The patient presented with elevated blood pressure and headache, accompanied by symptoms suggestive of left-sided upper lip numbness, which subjectively extended to the arm and leg, inducing a sensation of weakness on the left side of the body. A thorough workup, including brain MRI revealing no acute infarction but identifying hyperdensities, likely related to small vessel disease, was conducted. Additionally, a transthoracic echocardiogram (TTE) showed no significant abnormalities, and a magnetic resonance venogram (MRV) was performed due to the presence of headache. Laboratory findings indicated normal levels of erythrocyte sedimentation rate (ESR) and C-reactive protein (CRP), with LDL at 139 and A1C at 5.6. The patient's ABCD2 score of 5 suggested an estimated 90-day stroke risk of 9.8%, leading to the determination that, given her risk factors, the favored differential diagnosis was a transient ischemic attack (TIA). Consequently, she was initiated on aspirin 81 mg daily and a high-dose statin. Counseling encompassed the importance of achieving a target LDL <70, scheduling follow-up lipid panels with the primary care provider (PCP), and maintaining blood pressure below 140/90, with a reminder to adhere to medication regimens, acknowledging her previous non-compliance with amlodipine. Recommendations also included incorporating daily exercise and a balanced diet, along with strict adherence to medical treatment. While acknowledging that symptoms might be related to elevated blood pressure causing focal neurological deficits, the decision to treat was upheld, considering the outweighing risks over benefits. With NIHSS and MRS scores at 0, the patient was informed about the option for interval follow-up, though management adjustments were deemed unnecessary, contingent upon adherence to secondary stroke prevention measures and regular PCP visits. Finally, it was advised to discuss the risk of aspirin therapy with a gastroenterologist due to the patient's history of gastric ulcers.  ---   This revision aims to enhance readability, organization, and professionalism. [Time Spent: ___ minutes] : I have spent [unfilled] minutes of time on the encounter.

## 2024-04-04 NOTE — HISTORY OF PRESENT ILLNESS
[FreeTextEntry1] : 59 yo RHD female admit 3/11-3/12/24 with PMH of HTN (uncontrolled, non-adherent to amlodipine), asthma, RA, gastric ulcers, fibromyalgia, distant h/o migraines, no h/o stroke, non-smoker, not on antithrombotics, who presents to [facility] ED due to headache followed by left body numbness and weakness.  Impression: Severe, unilateral HA, followed by left sensorimotor symptoms, rapidly migrating from face to leg, w/ marked subjective improvement likely due to TIA as MRI negative for acute infarct. Patient was admitted for dysphagia due to failing dysphagia screen. Patient was seen by speech therapy and underwent MBS and was recommended for regular diet.  4/2/2024:  pt arrives for an initial HFU reports chronic stable left sided headaches on/off over 30 years.  Reports dysphagia has resolved had f/u with ENT with intermittent very mild left upper lip tingling as times improving.  Psychiatrist has prescribed Xanax which she is taking helps her sleep.  Otherwise, taking ASA 81, atorvastatin 80 mg, and amlodipine as directed with /81 today.   Has PCP and knows to f/u asap for medical management.   Copied from hospital course:  Patient reports that she woke up this morning at 04:00 AM 3/11/24 with a severe headache affecting her right forehead, difficult for her to characterize, not associated with photophobia, nausea, or vomiting. She remained in bed unable to fall back to sleep. At about 07:00 AM, she felt an "electric" sensation throughout her left body. She has had this "electric" type of sensation in the past, which she attributes to fibromyalgia, however, it is usually in "patches," rather than affecting her entire unilateral body. This electric sensation was followed by her noticing that her left face (pointing to V1-V3) felt numb and maybe a little "tight." She subsequently had left upper extremity numbness, followed by left lower extremity numbness. It is difficult for her to report how long it took for this sensation to migrate from face to lower extremity, but seemingly less than one minute. She eventually stood up to get out of bed, which is when she noticed that she had weakness in her LLE associated with difficulty ambulating. She also endorsed that she had some LUE weakness at this time as well. Patient took four aspirin 81 mg tablets (aspirin 325 mg x 1) prior to coming to the ED. She reports that she noticed some difficulty swallowing her  AM medications, including the aspirin. She has never had the symptoms above in the past.  Code stroke called 10:17 AM 3/11/24 upon arrival to the ED. At that time, patient reporting at least 50% improvement of her symptoms. On arrival, her headache is only "slight." She denies changes in speech, vision, hearing, voice quality, or balance/room-spinning sensations. Regarding migraine history, she reports having "very bad" migraine headaches in the past, but she has not had a migraine headache since she was 36 years old.  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 (Again, history is unclear.) She states that she was apparently worked up for multiple sclerosis about 10 years ago by a neurologist due to an unusual finding by an optometrist or ophthalmologist. She denies having had MRIs at that time as part of her MS workup. Patient had a lumbar puncture at that time, which was reportedly unrevealing. She denies history of vision loss in the past. She also denies past symptoms of focal neurological deficits, like numbness or weakness. She also reports history of cervical and lumbosacral pathology, described as herniations.  Patient admits to non-adherence to her amlodipine. Per patient and her partner at bedside, patient's blood pressure is often elevated at home. SBP > 160 and DBP > 100 is not unusual. She has been told many times in the past to adhere to her BP medication. She denies any adverse effect from amlodipine, only stating that she does not always like to take her medication.   IMAGING:  CT Brain Stroke Protocol (03.11.24 Mild small vessel white matter ischemic changes without significant change from 6/7/2019. Normal CT perfusion. Normal CTA of the head and neck. 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  MR Head No Cont (03.12.24 No acute intracranial hemorrhage or evidence of acute ischemia. Multiple similar-appearing nonspecific abnormal white matter foci of T2/FLAIR prolongation statistically favoring microvascular type changes.  Discharge Medications  ALPRAZolam 0.5 mg oral tablet: 2 tab(s) orally once a day as needed for  anxiety amLODIPine 10 mg oral tablet: 1 tab(s) orally once a day aspirin 81 mg oral tablet, chewable: 1 tab(s) orally once a day MDD: 81mg atorvastatin 80 mg oral tablet: 1 tab(s) orally once a day (at bedtime) MDD: 80mg carisoprodol 350 mg oral tablet: 1 tab(s) orally once a day (at bedtime) desvenlafaxine (as succinate) 50 mg oral tablet, extended release: 1 tab(s) orally 2 times a day pantoprazole 40 mg oral delayed release tablet: 1 tab(s) orally once a day (before a meal) MDD: 40mg Symbicort 160 mcg-4.5 mcg/inh inhalation aerosol: 2 puff(s) inhaled 2 times a day  Care Providers for Follow up (PCP/Outpatient Provider)  [facility] [address] [place] [id number] Phone: [phone] Fax: [phone] Follow Up Time: 2 weeks

## 2024-04-04 NOTE — REVIEW OF SYSTEMS
[Tingling] : tingling [As Noted in HPI] : as noted in HPI [Negative] : Cardiovascular [Confused or Disoriented] : no confusion [Facial Weakness] : no facial weakness [Hand Weakness] : no hand weakness [Leg Weakness] : no leg weakness [Seizures] : no convulsions [Difficulty Walking] : no difficulty walking [Suicidal] : not suicidal [Shortness Of Breath] : no shortness of breath [Cough] : no cough [Abdominal Pain] : no abdominal pain [Vomiting] : no vomiting [Diarrhea] : no diarrhea [Dysuria] : no dysuria [Skin Lesions] : no skin lesions [Muscle Weakness] : no muscle weakness [Easy Bleeding] : no tendency for easy bleeding [Easy Bruising] : no tendency for easy bruising [FreeTextEntry4] : chronic Left hearing issues, has hearing aid on left

## 2024-04-04 NOTE — ASSESSMENT
[FreeTextEntry1] : 57 yo female HFU s/p 3/11/24 acute TIA, with negative MRI likely due to uncontrolled HTN.  She is following discharge instructions and will schedule f/u with PCP asap for medical management.  at end of visit she reports shocks more on the right side at times, has cervial MRI from 2017 from [facility] does not report any significant findings of stenosis or spinal cord compression and she will f/u with neurolgist. In 2016 neurologist obtained an EMG, MRI cervical and lumbar spine all negative and she has not returned to them, then following this was diagnosed with Fibromyalgia. Has history of Gastric ulcer disease and unable to take NSAID's  LDL: 139 non smoker no family history of aneurysm  PCP: Gavino Vásquez [id number] [place] [address] [phone]  PLAN: f/u with PCP for medical management secondary stroke risk factors continue ASA 81 enteric coated, instructed to discuss if any GI risks due to history of gastric ulcers that will noe to discontinued therapy or change it  continue atorvastatin as per PCP  f/u general neurology for chronic pain     Patient knows to call the office if there are any new or worsening symptoms. All questions and concerns answered and addressed in detail to patient's complete satisfaction.  Patient verbalized understanding and agreed to plan.  Stroke prevention requires management of risk factors and patient education.  Risk factors include smoking, excess weight, hypertension, dyslipidemia, heavy alcohol use, physical inactivity, obesity and diabetes.  Blood pressure should be < 140/90.  Lipid lowering agents may reduce risk of stroke.   patient to maintain regular exercise and body weight and control intake of alcohol.

## 2024-04-04 NOTE — PHYSICAL EXAM
[FreeTextEntry1] : Awake, alert, and oriented x 3. VSS. In no apparent distress.    EOMI, no nystagmus, facial sensation intact symmetrically, face symmetrical, head turning and shoulder shrug symmetric and no tongue deviation with protrusion.  No pronator drift.   No past-pointing, no tremors noted, no dysdiadochokinesia, and finger to nose dysmetria was not present.  Romberg negative.   Right hand dominant. Rises from a seated position in a comfortable fashion.  Gait is well coordinated and stable without the use of an assistive device.  Motor strength in the upper extremities 5/5 in the biceps, triceps, and hand .  Motor strength in the lower extremities is 5/5 in the iliopsoas, quadriceps, and hamstrings.

## 2024-05-29 ENCOUNTER — APPOINTMENT (OUTPATIENT)
Dept: GASTROENTEROLOGY | Facility: CLINIC | Age: 59
End: 2024-05-29
Payer: COMMERCIAL

## 2024-05-29 VITALS
WEIGHT: 144 LBS | HEART RATE: 68 BPM | OXYGEN SATURATION: 98 % | DIASTOLIC BLOOD PRESSURE: 76 MMHG | SYSTOLIC BLOOD PRESSURE: 126 MMHG | HEIGHT: 62 IN | BODY MASS INDEX: 26.5 KG/M2

## 2024-05-29 DIAGNOSIS — R19.7 DIARRHEA, UNSPECIFIED: ICD-10-CM

## 2024-05-29 DIAGNOSIS — R14.3 FLATULENCE: ICD-10-CM

## 2024-05-29 PROCEDURE — 99214 OFFICE O/P EST MOD 30 MIN: CPT

## 2024-05-30 PROBLEM — R14.3 FLATULENCE: Status: ACTIVE | Noted: 2024-05-30

## 2024-05-30 NOTE — ASSESSMENT
[FreeTextEntry1] : 58F, asthma, fibromyalgia, RA, s/p ccy, here with abdominal pain, diarrhea, gas/bloat, flatulence. EGD/Colon as below. No fhx of GI malignancy.  # Abdominal pain # Gas/bloat # diarrhea  - check stool studies today  - ct abd/pelvis for cross sectional imaging  - food/symptom journal  - depending on above, will proceed w EGD/Cscope   # Gastric ulcers - s/p EGD Jan 2023 - superficial and cratered nonbleeding ulcers in the antrum. Path = negative for h pylori - repeat EGD march 2023 - normal esophagus, mild erythema in gastric body, normal duodenum - avoid nsaids  # Colon cancer screening No fhx of colon cancer Colonoscopy 2019 - Dr Velez - grade 1 internal hemorrhoids, otherwise normal colonoscopy.  RTC after stool studies, imaging

## 2024-05-30 NOTE — HISTORY OF PRESENT ILLNESS
[FreeTextEntry1] : Here for f/u visit.  She was hospitalized in March 2024 for TIA  Hospital course c/b dysphagia, hoarseness  Seen by speech/swallow, was placed on regular diet and advised to f/u w speech/swallow team as outpatient.  She was seen by ENT as well, told she has LPRD and PPI recommended   Here for abdominal pain  Reports diffuse cramping upper and lower abdominal pain + nausea, no vomiting + flatulence and gas/bloat Worse with eating  Pain improves if fasting  She is compliant w PPI BID   Bowel movements have most recently been loose  3-4 x per day  No blood in stool  No new meds, travel, abx use  She denies wt loss

## 2024-06-05 ENCOUNTER — RESULT REVIEW (OUTPATIENT)
Age: 59
End: 2024-06-05

## 2024-06-06 LAB — GI PCR PANEL: NOT DETECTED

## 2024-06-13 ENCOUNTER — OUTPATIENT (OUTPATIENT)
Dept: OUTPATIENT SERVICES | Facility: HOSPITAL | Age: 59
LOS: 1 days | End: 2024-06-13
Payer: COMMERCIAL

## 2024-06-13 ENCOUNTER — APPOINTMENT (OUTPATIENT)
Dept: CT IMAGING | Facility: CLINIC | Age: 59
End: 2024-06-13
Payer: COMMERCIAL

## 2024-06-13 DIAGNOSIS — Z00.8 ENCOUNTER FOR OTHER GENERAL EXAMINATION: ICD-10-CM

## 2024-06-13 DIAGNOSIS — Z98.89 OTHER SPECIFIED POSTPROCEDURAL STATES: Chronic | ICD-10-CM

## 2024-06-13 DIAGNOSIS — R10.9 UNSPECIFIED ABDOMINAL PAIN: ICD-10-CM

## 2024-06-13 PROCEDURE — 74177 CT ABD & PELVIS W/CONTRAST: CPT | Mod: 26

## 2024-06-13 PROCEDURE — 74177 CT ABD & PELVIS W/CONTRAST: CPT

## 2024-06-25 DIAGNOSIS — R10.9 UNSPECIFIED ABDOMINAL PAIN: ICD-10-CM

## 2024-06-25 DIAGNOSIS — R14.0 ABDOMINAL DISTENSION (GASEOUS): ICD-10-CM

## 2024-06-25 LAB
C DIFF TOXIN B QL PCR REFLEX: NORMAL
CALPROTECTIN FECAL: 699 UG/G
DEPRECATED O AND P PREP STL: NORMAL
GDH ANTIGEN: NOT DETECTED
PANCREATIC ELASTASE, FECAL: 103 CD:794062645
TOXIN A AND B: NOT DETECTED

## 2024-06-25 RX ORDER — POLYETHYLENE GLYCOL-3350 AND ELECTROLYTES 236; 6.74; 5.86; 2.97; 22.74 G/274.31G; G/274.31G; G/274.31G; G/274.31G; G/274.31G
236 POWDER, FOR SOLUTION ORAL
Qty: 1 | Refills: 0 | Status: ACTIVE | COMMUNITY
Start: 2024-06-25 | End: 1900-01-01

## 2024-07-23 ENCOUNTER — APPOINTMENT (OUTPATIENT)
Dept: GASTROENTEROLOGY | Facility: HOSPITAL | Age: 59
End: 2024-07-23

## 2024-07-23 ENCOUNTER — TRANSCRIPTION ENCOUNTER (OUTPATIENT)
Age: 59
End: 2024-07-23

## 2024-07-23 ENCOUNTER — RESULT REVIEW (OUTPATIENT)
Age: 59
End: 2024-07-23

## 2024-08-02 NOTE — H&P PST ADULT - NS PRO FEM REPRO HEALTH SCREEN
Anesthesia Post-op Note    Patient: Sofie Collins  Procedure(s) Performed: COLONOSCOPY   Anesthesia type: MAC    Vitals Value Taken Time   Temp 36.7 °C (98 °F) 08/02/24 1145   Pulse 82 08/02/24 1145   Resp 13 08/02/24 1145   SpO2 98 % 08/02/24 1145   /62 08/02/24 1145         Patient Location: Phase II  Post-op Vital Signs:stable  Level of Consciousness: sedated  Respiratory Status: spontaneous ventilation and unassisted  Cardiovascular stable  Hydration: euvolemic  Pain Management: well controlled  Handoff: Handoff to receiving clinician was performed and questions were answered  Vomiting: none  Nausea: None  Airway Patency:patent  Post-op Assessment: no complications and patient tolerated procedure well      No notable events documented.                       mammogram

## 2024-08-09 PROBLEM — R13.10 DYSPHAGIA, UNSPECIFIED TYPE: Status: ACTIVE | Noted: 2024-08-09

## 2024-09-10 ENCOUNTER — TRANSCRIPTION ENCOUNTER (OUTPATIENT)
Age: 59
End: 2024-09-10

## 2024-09-10 ENCOUNTER — APPOINTMENT (OUTPATIENT)
Dept: GASTROENTEROLOGY | Facility: HOSPITAL | Age: 59
End: 2024-09-10

## 2024-09-10 ENCOUNTER — OUTPATIENT (OUTPATIENT)
Dept: OUTPATIENT SERVICES | Facility: HOSPITAL | Age: 59
LOS: 1 days | End: 2024-09-10
Payer: COMMERCIAL

## 2024-09-10 VITALS
OXYGEN SATURATION: 100 % | HEIGHT: 62 IN | WEIGHT: 138.89 LBS | SYSTOLIC BLOOD PRESSURE: 159 MMHG | RESPIRATION RATE: 18 BRPM | DIASTOLIC BLOOD PRESSURE: 85 MMHG | TEMPERATURE: 98 F | HEART RATE: 67 BPM

## 2024-09-10 DIAGNOSIS — Z98.89 OTHER SPECIFIED POSTPROCEDURAL STATES: Chronic | ICD-10-CM

## 2024-09-10 DIAGNOSIS — R13.10 DYSPHAGIA, UNSPECIFIED: ICD-10-CM

## 2024-09-10 PROCEDURE — 91013 ESOPHGL MOTIL W/STIM/PERFUS: CPT | Mod: 26

## 2024-09-10 PROCEDURE — 91010 ESOPHAGUS MOTILITY STUDY: CPT

## 2024-09-10 PROCEDURE — 91038 ESOPH IMPED FUNCT TEST > 1HR: CPT

## 2024-09-10 PROCEDURE — 91010 ESOPHAGUS MOTILITY STUDY: CPT | Mod: 26

## 2024-09-10 PROCEDURE — 91037 ESOPH IMPED FUNCTION TEST: CPT | Mod: 26

## 2024-09-10 NOTE — PRE PROCEDURE NOTE - PRE PROCEDURE EVALUATION
Attending Physician:             Dr. Gena Medina    Indication for Procedure:      Dysphagia for Solids              PAST MEDICAL & SURGICAL HISTORY:  Seasonal allergies      Rheumatoid arthritis      H/O fibromyalgia      Asthma      Hypertension      History of   1986      H/O bilateral breast reduction surgery              See Allscripts Note for further details  ALLERGIES:  No Known Allergies    HOME MEDICATIONS:  ALPRAZolam 0.5 mg oral tablet: 2 tab(s) orally once a day as needed for  anxiety  amLODIPine 10 mg oral tablet: 1 tab(s) orally once a day  carisoprodol 350 mg oral tablet: 1 tab(s) orally once a day (at bedtime)  desvenlafaxine (as succinate) 50 mg oral tablet, extended release: 1 tab(s) orally 2 times a day  Kevzara 200 mg/1.14 mL subcutaneous solution: 200 milligram(s) subcutaneously once a week  leflunomide 20 mg oral tablet: 1 tab(s) orally once a day  losartan 100 mg oral tablet: 1 tab(s) orally once a day      See Allscripts Note for further details    AICD/PPM: [ ] yes   [ ] no    PERTINENT LAB DATA:                      PHYSICAL EXAMINATION:    T(C): --  HR: --  BP: --  RR: --  SpO2: --    Constitutional: NAD  HEENT: PERRLA, EOMI,    Neck:  No JVD  Respiratory: CTAB/L  Cardiovascular: S1 and S2  Gastrointestinal: BS+, soft, NT/ND  Extremities: No peripheral edema  Neurological: A/O x 3, no focal deficits  Psychiatric: Normal mood, normal affect  Skin: No rashes    ASA Class: I [ ]  II [x ]  III [ ]  IV [ ]    COMMENTS:    The patient is a suitable candidate for the planned procedure unless box checked [ ]  No, explain:

## 2024-11-13 ENCOUNTER — APPOINTMENT (OUTPATIENT)
Dept: GASTROENTEROLOGY | Facility: CLINIC | Age: 59
End: 2024-11-13
Payer: COMMERCIAL

## 2024-11-13 VITALS
SYSTOLIC BLOOD PRESSURE: 127 MMHG | WEIGHT: 136 LBS | DIASTOLIC BLOOD PRESSURE: 79 MMHG | HEIGHT: 62 IN | HEART RATE: 68 BPM | BODY MASS INDEX: 25.03 KG/M2 | OXYGEN SATURATION: 98 %

## 2024-11-13 DIAGNOSIS — K86.89 OTHER SPECIFIED DISEASES OF PANCREAS: ICD-10-CM

## 2024-11-13 DIAGNOSIS — R19.7 DIARRHEA, UNSPECIFIED: ICD-10-CM

## 2024-11-13 DIAGNOSIS — R14.0 ABDOMINAL DISTENSION (GASEOUS): ICD-10-CM

## 2024-11-13 DIAGNOSIS — R10.9 UNSPECIFIED ABDOMINAL PAIN: ICD-10-CM

## 2024-11-13 DIAGNOSIS — R13.10 DYSPHAGIA, UNSPECIFIED: ICD-10-CM

## 2024-11-13 PROCEDURE — 99214 OFFICE O/P EST MOD 30 MIN: CPT

## 2024-11-14 PROBLEM — K86.89 PANCREATIC INSUFFICIENCY: Status: ACTIVE | Noted: 2024-11-14

## 2024-11-14 RX ORDER — PANCRELIPASE 120000; 24000; 76000 [USP'U]/1; [USP'U]/1; [USP'U]/1
24000-76000 CAPSULE, DELAYED RELEASE PELLETS ORAL
Qty: 400 | Refills: 10 | Status: ACTIVE | COMMUNITY
Start: 2024-11-14 | End: 1900-01-01

## 2024-12-13 ENCOUNTER — APPOINTMENT (OUTPATIENT)
Dept: MRI IMAGING | Facility: IMAGING CENTER | Age: 59
End: 2024-12-13
Payer: COMMERCIAL

## 2024-12-13 ENCOUNTER — RESULT REVIEW (OUTPATIENT)
Age: 59
End: 2024-12-13

## 2024-12-13 ENCOUNTER — OUTPATIENT (OUTPATIENT)
Dept: OUTPATIENT SERVICES | Facility: HOSPITAL | Age: 59
LOS: 1 days | End: 2024-12-13
Payer: COMMERCIAL

## 2024-12-13 DIAGNOSIS — R19.7 DIARRHEA, UNSPECIFIED: ICD-10-CM

## 2024-12-13 DIAGNOSIS — Z00.8 ENCOUNTER FOR OTHER GENERAL EXAMINATION: ICD-10-CM

## 2024-12-13 DIAGNOSIS — Z98.89 OTHER SPECIFIED POSTPROCEDURAL STATES: Chronic | ICD-10-CM

## 2024-12-13 PROCEDURE — 74183 MRI ABD W/O CNTR FLWD CNTR: CPT | Mod: 26

## 2024-12-13 PROCEDURE — 72197 MRI PELVIS W/O & W/DYE: CPT

## 2024-12-13 PROCEDURE — A9585: CPT

## 2024-12-13 PROCEDURE — 72197 MRI PELVIS W/O & W/DYE: CPT | Mod: 26

## 2024-12-13 PROCEDURE — 74183 MRI ABD W/O CNTR FLWD CNTR: CPT

## 2024-12-30 ENCOUNTER — APPOINTMENT (OUTPATIENT)
Dept: GASTROENTEROLOGY | Facility: CLINIC | Age: 59
End: 2024-12-30
Payer: COMMERCIAL

## 2024-12-30 DIAGNOSIS — R14.0 ABDOMINAL DISTENSION (GASEOUS): ICD-10-CM

## 2024-12-30 DIAGNOSIS — R10.9 UNSPECIFIED ABDOMINAL PAIN: ICD-10-CM

## 2024-12-30 DIAGNOSIS — R19.7 DIARRHEA, UNSPECIFIED: ICD-10-CM

## 2024-12-30 DIAGNOSIS — R13.10 DYSPHAGIA, UNSPECIFIED: ICD-10-CM

## 2024-12-30 DIAGNOSIS — K25.9 GASTRIC ULCER, UNSPECIFIED AS ACUTE OR CHRONIC, W/OUT HEMORRHAGE OR PERFORATION: ICD-10-CM

## 2024-12-30 PROCEDURE — 99214 OFFICE O/P EST MOD 30 MIN: CPT

## 2024-12-30 RX ORDER — OMEPRAZOLE 40 MG/1
40 CAPSULE, DELAYED RELEASE ORAL TWICE DAILY
Qty: 60 | Refills: 3 | Status: ACTIVE | COMMUNITY
Start: 2024-12-30 | End: 1900-01-01

## 2025-01-28 ENCOUNTER — APPOINTMENT (OUTPATIENT)
Dept: GASTROENTEROLOGY | Facility: CLINIC | Age: 60
End: 2025-01-28
Payer: COMMERCIAL

## 2025-01-28 PROCEDURE — 99212 OFFICE O/P EST SF 10 MIN: CPT | Mod: 93

## 2025-03-10 ENCOUNTER — RESULT REVIEW (OUTPATIENT)
Age: 60
End: 2025-03-10

## 2025-03-10 ENCOUNTER — TRANSCRIPTION ENCOUNTER (OUTPATIENT)
Age: 60
End: 2025-03-10

## 2025-03-10 ENCOUNTER — OUTPATIENT (OUTPATIENT)
Dept: OUTPATIENT SERVICES | Facility: HOSPITAL | Age: 60
LOS: 1 days | End: 2025-03-10
Payer: COMMERCIAL

## 2025-03-10 ENCOUNTER — APPOINTMENT (OUTPATIENT)
Dept: GASTROENTEROLOGY | Facility: HOSPITAL | Age: 60
End: 2025-03-10

## 2025-03-10 VITALS
OXYGEN SATURATION: 97 % | DIASTOLIC BLOOD PRESSURE: 62 MMHG | RESPIRATION RATE: 17 BRPM | HEART RATE: 73 BPM | SYSTOLIC BLOOD PRESSURE: 109 MMHG

## 2025-03-10 VITALS
DIASTOLIC BLOOD PRESSURE: 62 MMHG | HEIGHT: 62 IN | TEMPERATURE: 98 F | RESPIRATION RATE: 17 BRPM | HEART RATE: 60 BPM | WEIGHT: 136.03 LBS | OXYGEN SATURATION: 99 % | SYSTOLIC BLOOD PRESSURE: 122 MMHG

## 2025-03-10 DIAGNOSIS — K86.89 OTHER SPECIFIED DISEASES OF PANCREAS: ICD-10-CM

## 2025-03-10 DIAGNOSIS — Z98.89 OTHER SPECIFIED POSTPROCEDURAL STATES: Chronic | ICD-10-CM

## 2025-03-10 PROCEDURE — 43239 EGD BIOPSY SINGLE/MULTIPLE: CPT

## 2025-03-10 PROCEDURE — 43259 EGD US EXAM DUODENUM/JEJUNUM: CPT | Mod: GC

## 2025-03-10 PROCEDURE — 43239 EGD BIOPSY SINGLE/MULTIPLE: CPT | Mod: GC

## 2025-03-10 PROCEDURE — 43237 ENDOSCOPIC US EXAM ESOPH: CPT

## 2025-03-10 PROCEDURE — 88305 TISSUE EXAM BY PATHOLOGIST: CPT

## 2025-03-10 PROCEDURE — 88305 TISSUE EXAM BY PATHOLOGIST: CPT | Mod: 26

## 2025-03-10 NOTE — ASU PATIENT PROFILE, ADULT - NSICDXPASTSURGICALHX_GEN_ALL_CORE_FT
PAST SURGICAL HISTORY:  H/O bilateral breast reduction surgery     History of      History of cholecystectomy

## 2025-03-10 NOTE — ASU DISCHARGE PLAN (ADULT/PEDIATRIC) - FINANCIAL ASSISTANCE
[facility] provides services at a reduced cost to those who are determined to be eligible through [facility]’s financial assistance program. Information regarding [facility]’s financial assistance program can be found by going to [email or web address] or by calling [phone].

## 2025-03-10 NOTE — PRE PROCEDURE NOTE - PRE PROCEDURE EVALUATION
Attending Physician:   Tomás                 Procedure: EUS    Indication for Procedure: EUS  ________________________________________________________  PAST MEDICAL & SURGICAL HISTORY:  Seasonal allergies      Rheumatoid arthritis      H/O fibromyalgia      Asthma      Hypertension      History of         H/O bilateral breast reduction surgery          ALLERGIES:  No Known Allergies    HOME MEDICATIONS:  ALPRAZolam 0.5 mg oral tablet: 2 tab(s) orally once a day as needed for  anxiety  amLODIPine 10 mg oral tablet: 1 tab(s) orally once a day  carisoprodol 350 mg oral tablet: 1 tab(s) orally once a day (at bedtime)  desvenlafaxine (as succinate) 50 mg oral tablet, extended release: 1 tab(s) orally 2 times a day  Kevzara 200 mg/1.14 mL subcutaneous solution: 200 milligram(s) subcutaneously once a week  leflunomide 20 mg oral tablet: 1 tab(s) orally once a day  losartan 100 mg oral tablet: 1 tab(s) orally once a day    AICD/PPM: [ ] yes   [x ] no    PERTINENT LAB DATA:                      PHYSICAL EXAMINATION:    vitals wnl    Constitutional: NAD  Neck:  supple  Respiratory: no respiratory stress, no audible wheezes  Cardiovascular: RR  Gastrointestinal: soft, NT/ND  Extremities: No peripheral edema  Neurological: Alert, no focal deficits  Psychiatric: Normal mood, normal affect  Skin: No rashes      COMMENTS:    The patient is a suitable candidate for the planned procedure unless box checked [ ]  No, explain:

## 2025-03-10 NOTE — ASU DISCHARGE PLAN (ADULT/PEDIATRIC) - NS MD DC FALL RISK RISK
For information on Fall & Injury Prevention, visit: [email or web address] OR  [email or web address] OR  https://www.cdc.gov/steadi/patient.html

## 2025-03-10 NOTE — ASU PATIENT PROFILE, ADULT - FALL HARM RISK - UNIVERSAL INTERVENTIONS
Bed in lowest position, wheels locked, appropriate side rails in place/Call bell, personal items and telephone in reach/Instruct patient to call for assistance before getting out of bed or chair/Non-slip footwear when patient is out of bed/[place] to call system/Physically safe environment - no spills, clutter or unnecessary equipment/Purposeful Proactive Rounding/Room/bathroom lighting operational, light cord in reach

## 2025-03-11 LAB — SURGICAL PATHOLOGY STUDY: SIGNIFICANT CHANGE UP

## 2025-03-17 ENCOUNTER — TRANSCRIPTION ENCOUNTER (OUTPATIENT)
Age: 60
End: 2025-03-17

## 2025-03-18 ENCOUNTER — TRANSCRIPTION ENCOUNTER (OUTPATIENT)
Age: 60
End: 2025-03-18

## 2025-04-14 ENCOUNTER — NON-APPOINTMENT (OUTPATIENT)
Age: 60
End: 2025-04-14

## 2025-04-16 ENCOUNTER — APPOINTMENT (OUTPATIENT)
Dept: GASTROENTEROLOGY | Facility: CLINIC | Age: 60
End: 2025-04-16
Payer: COMMERCIAL

## 2025-04-16 VITALS
HEIGHT: 62 IN | TEMPERATURE: 98.2 F | OXYGEN SATURATION: 98 % | WEIGHT: 140 LBS | SYSTOLIC BLOOD PRESSURE: 132 MMHG | BODY MASS INDEX: 25.76 KG/M2 | DIASTOLIC BLOOD PRESSURE: 84 MMHG | HEART RATE: 70 BPM

## 2025-04-16 DIAGNOSIS — K86.89 OTHER SPECIFIED DISEASES OF PANCREAS: ICD-10-CM

## 2025-04-16 DIAGNOSIS — R14.0 ABDOMINAL DISTENSION (GASEOUS): ICD-10-CM

## 2025-04-16 PROCEDURE — G2211 COMPLEX E/M VISIT ADD ON: CPT | Mod: NC

## 2025-04-16 PROCEDURE — 99214 OFFICE O/P EST MOD 30 MIN: CPT

## 2025-04-16 RX ORDER — DICYCLOMINE HYDROCHLORIDE 10 MG/1
10 CAPSULE ORAL
Qty: 120 | Refills: 3 | Status: ACTIVE | COMMUNITY
Start: 2025-04-16 | End: 1900-01-01

## 2025-04-16 RX ORDER — PANCRELIPASE 36000; 180000; 114000 [USP'U]/1; [USP'U]/1; [USP'U]/1
36000-114000 CAPSULE, DELAYED RELEASE PELLETS ORAL
Qty: 720 | Refills: 3 | Status: ACTIVE | COMMUNITY
Start: 2025-04-16 | End: 1900-01-01

## 2025-04-18 LAB — 25(OH)D3 SERPL-MCNC: 54.4 NG/ML

## 2025-04-23 LAB
A-TOCOPHEROL VIT E SERPL-MCNC: 12.3 MG/L
BETA+GAMMA TOCOPHEROL SERPL-MCNC: 2 MG/L
VIT A SERPL-MCNC: 97.3 UG/DL

## 2025-04-26 LAB — MENADIONE SERPL-MCNC: 1.76 NG/ML

## 2025-05-12 ENCOUNTER — RX RENEWAL (OUTPATIENT)
Age: 60
End: 2025-05-12

## 2025-05-27 ENCOUNTER — TRANSCRIPTION ENCOUNTER (OUTPATIENT)
Age: 60
End: 2025-05-27

## 2025-05-28 ENCOUNTER — APPOINTMENT (OUTPATIENT)
Dept: SURGERY | Facility: CLINIC | Age: 60
End: 2025-05-28

## 2025-05-28 ENCOUNTER — APPOINTMENT (OUTPATIENT)
Dept: GASTROENTEROLOGY | Facility: CLINIC | Age: 60
End: 2025-05-28
Payer: COMMERCIAL

## 2025-05-28 ENCOUNTER — TRANSCRIPTION ENCOUNTER (OUTPATIENT)
Age: 60
End: 2025-05-28

## 2025-05-28 VITALS
SYSTOLIC BLOOD PRESSURE: 129 MMHG | BODY MASS INDEX: 26.5 KG/M2 | HEIGHT: 62 IN | WEIGHT: 144 LBS | DIASTOLIC BLOOD PRESSURE: 82 MMHG | OXYGEN SATURATION: 97 % | HEART RATE: 75 BPM

## 2025-05-28 DIAGNOSIS — K86.89 OTHER SPECIFIED DISEASES OF PANCREAS: ICD-10-CM

## 2025-05-28 PROCEDURE — G2211 COMPLEX E/M VISIT ADD ON: CPT | Mod: NC

## 2025-05-28 PROCEDURE — 99214 OFFICE O/P EST MOD 30 MIN: CPT

## 2025-05-28 RX ORDER — FAMOTIDINE 40 MG/1
40 TABLET, FILM COATED ORAL TWICE DAILY
Qty: 120 | Refills: 3 | Status: ACTIVE | COMMUNITY
Start: 2025-05-28 | End: 1900-01-01

## 2025-07-08 ENCOUNTER — RX RENEWAL (OUTPATIENT)
Age: 60
End: 2025-07-08

## 2025-07-11 ENCOUNTER — TRANSCRIPTION ENCOUNTER (OUTPATIENT)
Age: 60
End: 2025-07-11

## 2025-08-06 ENCOUNTER — APPOINTMENT (OUTPATIENT)
Dept: GASTROENTEROLOGY | Facility: CLINIC | Age: 60
End: 2025-08-06
Payer: COMMERCIAL

## 2025-08-06 DIAGNOSIS — K86.89 OTHER SPECIFIED DISEASES OF PANCREAS: ICD-10-CM

## 2025-08-06 DIAGNOSIS — M79.7 FIBROMYALGIA: ICD-10-CM

## 2025-08-06 PROCEDURE — 99214 OFFICE O/P EST MOD 30 MIN: CPT | Mod: 95

## 2025-08-06 PROCEDURE — G2211 COMPLEX E/M VISIT ADD ON: CPT | Mod: NC,95

## (undated) DEVICE — SYR LUER LOK 50CC

## (undated) DEVICE — TUBING IV SET GRAVITY 3Y 100" MACRO

## (undated) DEVICE — SENSOR O2 FINGER ADULT

## (undated) DEVICE — SUCTION YANKAUER NO CONTROL VENT

## (undated) DEVICE — SYR ALLIANCE II INFLATION 60ML

## (undated) DEVICE — CATH IV SAFE BC 22G X 1" (BLUE)

## (undated) DEVICE — CATH IV SAFE BC 20G X 1.16" (PINK)

## (undated) DEVICE — TUBING SUCTION CONN 6FT STERILE

## (undated) DEVICE — SOL INJ NS 0.9% 500ML 2 PORT

## (undated) DEVICE — PACK IV START WITH CHG

## (undated) DEVICE — SYR LUER LOK 5CC

## (undated) DEVICE — FOLEY HOLDER STATLOCK 2 WAY ADULT

## (undated) DEVICE — BIOPSY FORCEP RADIAL JAW 4 STANDARD WITH NEEDLE

## (undated) DEVICE — TUBING SUCTION 20FT

## (undated) DEVICE — BITE BLOCK ADULT 20 X 27MM (GREEN)

## (undated) DEVICE — BALLOON US ENDO

## (undated) DEVICE — SYR LUER LOK 20CC